# Patient Record
Sex: MALE | Race: ASIAN | NOT HISPANIC OR LATINO | Employment: OTHER | URBAN - METROPOLITAN AREA
[De-identification: names, ages, dates, MRNs, and addresses within clinical notes are randomized per-mention and may not be internally consistent; named-entity substitution may affect disease eponyms.]

---

## 2017-01-05 DIAGNOSIS — Z01.812 PRE-OPERATIVE LABORATORY EXAMINATION: ICD-10-CM

## 2017-01-05 LAB
ERYTHROCYTE [DISTWIDTH] IN BLOOD BY AUTOMATED COUNT: 41 FL (ref 35.9–50)
HCT VFR BLD AUTO: 44.1 % (ref 42–52)
HGB BLD-MCNC: 15.2 G/DL (ref 14–18)
MCH RBC QN AUTO: 29.9 PG (ref 27–33)
MCHC RBC AUTO-ENTMCNC: 34.5 G/DL (ref 33.7–35.3)
MCV RBC AUTO: 86.6 FL (ref 81.4–97.8)
PLATELET # BLD AUTO: 281 K/UL (ref 164–446)
PMV BLD AUTO: 10.4 FL (ref 9–12.9)
RBC # BLD AUTO: 5.09 M/UL (ref 4.7–6.1)
WBC # BLD AUTO: 8.5 K/UL (ref 4.8–10.8)

## 2017-01-05 PROCEDURE — 36415 COLL VENOUS BLD VENIPUNCTURE: CPT

## 2017-01-05 PROCEDURE — 85027 COMPLETE CBC AUTOMATED: CPT

## 2017-01-05 NOTE — OR NURSING
PreAdmit Appointment: Patient instructed to continue regularly prescribed medications through day before surgery. Instructed to take the following medications the day of surgery with a sip of water per Anesthesia protocol: Roxicodone if needed, Albuterol Inhaler if needed. Instructed pt to bring Albuterol Inhaler DOS.

## 2017-01-10 ENCOUNTER — APPOINTMENT (OUTPATIENT)
Dept: RADIOLOGY | Facility: MEDICAL CENTER | Age: 32
End: 2017-01-10
Attending: ORTHOPAEDIC SURGERY
Payer: MEDICAID

## 2017-01-10 ENCOUNTER — HOSPITAL ENCOUNTER (OUTPATIENT)
Facility: MEDICAL CENTER | Age: 32
End: 2017-01-10
Attending: ORTHOPAEDIC SURGERY | Admitting: ORTHOPAEDIC SURGERY
Payer: MEDICAID

## 2017-01-10 VITALS
TEMPERATURE: 97.5 F | HEIGHT: 72 IN | SYSTOLIC BLOOD PRESSURE: 128 MMHG | OXYGEN SATURATION: 98 % | BODY MASS INDEX: 32.22 KG/M2 | WEIGHT: 237.88 LBS | RESPIRATION RATE: 16 BRPM | DIASTOLIC BLOOD PRESSURE: 79 MMHG | HEART RATE: 87 BPM

## 2017-01-10 PROBLEM — M24.461: Status: ACTIVE | Noted: 2017-01-10

## 2017-01-10 PROCEDURE — 700101 HCHG RX REV CODE 250

## 2017-01-10 PROCEDURE — 110382 HCHG SHELL REV 271: Performed by: ORTHOPAEDIC SURGERY

## 2017-01-10 PROCEDURE — 73560 X-RAY EXAM OF KNEE 1 OR 2: CPT | Mod: RT

## 2017-01-10 PROCEDURE — 160025 RECOVERY II MINUTES (STATS): Performed by: ORTHOPAEDIC SURGERY

## 2017-01-10 PROCEDURE — 700111 HCHG RX REV CODE 636 W/ 250 OVERRIDE (IP): Performed by: ORTHOPAEDIC SURGERY

## 2017-01-10 PROCEDURE — A9270 NON-COVERED ITEM OR SERVICE: HCPCS

## 2017-01-10 PROCEDURE — 160039 HCHG SURGERY MINUTES - EA ADDL 1 MIN LEVEL 3: Performed by: ORTHOPAEDIC SURGERY

## 2017-01-10 PROCEDURE — 160047 HCHG PACU  - EA ADDL 30 MINS PHASE II: Performed by: ORTHOPAEDIC SURGERY

## 2017-01-10 PROCEDURE — 700102 HCHG RX REV CODE 250 W/ 637 OVERRIDE(OP)

## 2017-01-10 PROCEDURE — 160035 HCHG PACU - 1ST 60 MINS PHASE I: Performed by: ORTHOPAEDIC SURGERY

## 2017-01-10 PROCEDURE — 160046 HCHG PACU - 1ST 60 MINS PHASE II: Performed by: ORTHOPAEDIC SURGERY

## 2017-01-10 PROCEDURE — 160028 HCHG SURGERY MINUTES - 1ST 30 MINS LEVEL 3: Performed by: ORTHOPAEDIC SURGERY

## 2017-01-10 PROCEDURE — 700111 HCHG RX REV CODE 636 W/ 250 OVERRIDE (IP)

## 2017-01-10 PROCEDURE — 160009 HCHG ANES TIME/MIN: Performed by: ORTHOPAEDIC SURGERY

## 2017-01-10 PROCEDURE — 110371 HCHG SHELL REV 272: Performed by: ORTHOPAEDIC SURGERY

## 2017-01-10 PROCEDURE — A4606 OXYGEN PROBE USED W OXIMETER: HCPCS | Performed by: ORTHOPAEDIC SURGERY

## 2017-01-10 PROCEDURE — 160036 HCHG PACU - EA ADDL 30 MINS PHASE I: Performed by: ORTHOPAEDIC SURGERY

## 2017-01-10 PROCEDURE — 160048 HCHG OR STATISTICAL LEVEL 1-5: Performed by: ORTHOPAEDIC SURGERY

## 2017-01-10 PROCEDURE — 160002 HCHG RECOVERY MINUTES (STAT): Performed by: ORTHOPAEDIC SURGERY

## 2017-01-10 RX ORDER — OXYCODONE HYDROCHLORIDE 5 MG/1
5 TABLET ORAL EVERY 4 HOURS PRN
Qty: 60 TAB | Refills: 0 | Status: SHIPPED | OUTPATIENT
Start: 2017-01-10 | End: 2017-05-17

## 2017-01-10 RX ORDER — OXYCODONE HCL 5 MG/5 ML
SOLUTION, ORAL ORAL
Status: COMPLETED
Start: 2017-01-10 | End: 2017-01-10

## 2017-01-10 RX ORDER — HYDROCODONE BITARTRATE AND ACETAMINOPHEN 5; 325 MG/1; MG/1
1 TABLET ORAL EVERY 4 HOURS PRN
COMMUNITY
End: 2017-05-17

## 2017-01-10 RX ORDER — SODIUM CHLORIDE, SODIUM LACTATE, POTASSIUM CHLORIDE, CALCIUM CHLORIDE 600; 310; 30; 20 MG/100ML; MG/100ML; MG/100ML; MG/100ML
INJECTION, SOLUTION INTRAVENOUS
Status: DISCONTINUED | OUTPATIENT
Start: 2017-01-10 | End: 2017-01-10 | Stop reason: HOSPADM

## 2017-01-10 RX ADMIN — OXYCODONE HYDROCHLORIDE 10 MG: 5 SOLUTION ORAL at 16:10

## 2017-01-10 RX ADMIN — FENTANYL CITRATE 25 MCG: 50 INJECTION, SOLUTION INTRAMUSCULAR; INTRAVENOUS at 16:10

## 2017-01-10 RX ADMIN — FENTANYL CITRATE 25 MCG: 50 INJECTION, SOLUTION INTRAMUSCULAR; INTRAVENOUS at 16:18

## 2017-01-10 ASSESSMENT — PAIN SCALES - GENERAL
PAINLEVEL_OUTOF10: 4
PAINLEVEL_OUTOF10: 3
PAINLEVEL_OUTOF10: ASSUMED PAIN PRESENT
PAINLEVEL_OUTOF10: 5
PAINLEVEL_OUTOF10: 3
PAINLEVEL_OUTOF10: 3
PAINLEVEL_OUTOF10: ASSUMED PAIN PRESENT

## 2017-01-10 NOTE — OR NURSING
Patient allergies and NPO status verified, home medication reconciliation completed and belongings secured. Surgical site verified with patient. Patient verbalizes understanding of pain scale, expected course of stay and plan of care; patient and family state verbal understanding at this time. IV access established. Sequential in place as ordered.

## 2017-01-10 NOTE — IP AVS SNAPSHOT
1/10/2017          Iggy Hoyos  [address] [facility] [address]    Dear Iggy:    [facility] wants to ensure your discharge home is safe and you or your loved ones have had all your questions answered regarding your care after you leave the hospital.    You may receive a telephone call within two days of your discharge.  This call is to make certain you understand your discharge instructions as well as ensure we provided you with the best care possible during your stay with us.     The call will only last approximately 3-5 minutes and will be done by a nurse.    Once again, we want to ensure your discharge home is safe and that you have a clear understanding of any next steps in your care.  If you have any questions or concerns, please do not hesitate to contact us, we are here for you.  Thank you for choosing [facility] for your healthcare needs.    Sincerely,    Kenney Hendrickson    [facility]

## 2017-01-10 NOTE — IP AVS SNAPSHOT
After Visit Summary                                                                                                                Name:Iggy Hoyos  Medical Record Number:[id number]  CSN: [id number]    YOB: 1985   Age: 31 y.o.  Sex: male  HT:1.829 m (6') WT: 107.9 kg (237 lb 14 oz)          Admit Date: 1/10/2017     Discharge Date:   Today's Date: 1/10/2017  Attending Doctor:  JAKE Watts*                  Allergies:  Review of patient's allergies indicates no known allergies.                Discharge Instructions         ACTIVITY: Rest and take it easy for the first 24 hours.  A responsible adult is recommended to remain with you during that time.  It is normal to feel sleepy.  We encourage you to not do anything that requires balance, judgment or coordination.    MILD FLU-LIKE SYMPTOMS ARE NORMAL. YOU MAY EXPERIENCE GENERALIZED MUSCLE ACHES, THROAT IRRITATION, HEADACHE AND/OR SOME NAUSEA.    FOR 24 HOURS DO NOT:  Drive, operate machinery or run household appliances.  Drink beer or alcoholic beverages.   Make important decisions or sign legal documents.    SPECIAL INSTRUCTIONS:   MOBILITY LOWER EXTREMITY  You should remain toe-touch/touch-down weightbearing on the operative extremity, using a walker or crutches for assistance with ambulation.  You should keep your lower extremity elevated at or above the level of your heart to help decrease pain and swelling.  You are to keep your heel off the bed at all times to prevent pressure ulcers from forming.  You will continue with physical therapy and rehabilitation exercises as described in the hospital.    BLOOD CLOT PROPHYLAXIS - Aspirin  You should take enteric-coated aspirin (such as Ecotrin) 325 mg twice daily.  You should also take Prilosec 40 mg daily or Pepcid 20 mg twice daily when taking the enteric-coated aspirin (both of these medications can be purchased over-the-counter).  You should wear the PARRIS hose provided in the  hospital for a total of 6 weeks as well.  These may be removed at night while sleeping.  You have been instructed in the signs and symptoms of deep venous thrombosis (DVT), including: calf swelling, pain or redness, fever, chills, or sweats.  You have been instructed on the signs and symptoms of pulmonary embolism (PE), including: chest pain or shortness of breath.  Should you develop any of the above symptoms, you should contact us immediately and/or visit the local urgent care or emergency room.     PAIN MEDICATION - Oxycodone  Oxycodone, a narcotic pain medication, can cause numerous side effects including nausea, constipation, sedation, and confusion.  Narcotic pain medication should be used for 1 to 2 weeks after surgery.  It is recommended that a stool softener be used while taking this medication.  Any over-the-counter stool softener or laxative, such as Colace, Dulcolax, or Senokot, is appropriate.  At any time, acetaminophen (Tylenol) may be used for pain control.  One gram of Tylenol can be taken safely in most patients every 6 hours.  Do not drive, operate heavy machinery, ride motorcycles or ATVs, drink alcohol, or take other drugs that make you tired or sleepy while taking narcotic pain medications.  You should wean off of the narcotic pain medications as tolerated.  We expect you would be COMPLETELY off narcotic pain medications by 2-3 weeks after surgery.    DIET: To avoid nausea, slowly advance diet as tolerated, avoiding spicy or greasy foods for the first day.  Add more substantial food to your diet according to your physician's instructions.  Babies can be fed formula or breast milk as soon as they are hungry.  INCREASE FLUIDS AND FIBER TO AVOID CONSTIPATION.    SURGICAL DRESSING/BATHING: See Dr. Mendoza's discharge instruction sheet.    FOLLOW-UP APPOINTMENT:  A follow-up appointment should be arranged with your doctor; as scheduled.    You should CALL YOUR PHYSICIAN if you develop:  Fever  greater than 101 degrees F.  Pain not relieved by medication, or persistent nausea or vomiting.  Excessive bleeding (blood soaking through dressing) or unexpected drainage from the wound.  Extreme redness or swelling around the incision site, drainage of pus or foul smelling drainage.  Inability to urinate or empty your bladder within 8 hours.  Problems with breathing or chest pain.    You should call 911 if you develop problems with breathing or chest pain.  If you are unable to contact your doctor or surgical center, you should go to the nearest emergency room or urgent care center.    Physician: Dr. Mendoza's telephone #: [phone]    If any questions arise, call your doctor.  If your doctor is not available, please feel free to call the Surgical Center at [phone].  The Center is open Monday through Friday from 7AM to 7PM.  You can also call the [facility] HOTLINE open 24 hours/day, 7 days/week and speak to a nurse at [phone], or toll free at [phone].    A registered nurse may call you a few days after your surgery to see how you are doing after your procedure.    MEDICATIONS: Resume taking daily medication.  Take prescribed pain medication with food.  If no medication is prescribed, you may take non-aspirin pain medication if needed.  PAIN MEDICATION CAN BE VERY CONSTIPATING.  Take a stool softener or laxative such as senokot, pericolace, or milk of magnesia if needed.    Prescription given for preoperatively.  Last pain medication given at 4:10PM (10mg oxycodone).    If your physician has prescribed pain medication that includes Acetaminophen (Tylenol), do not take additional Acetaminophen (Tylenol) while taking the prescribed medication.    Depression / Suicide Risk    As you are discharged from this [facility] facility, it is important to learn how to keep safe from harming yourself.    Recognize the warning signs:  · Abrupt changes in personality, positive or negative- including  increase in energy   · Giving away possessions  · Change in eating patterns- significant weight changes-  positive or negative  · Change in sleeping patterns- unable to sleep or sleeping all the time   · Unwillingness or inability to communicate  · Depression  · Unusual sadness, discouragement and loneliness  · Talk of wanting to die  · Neglect of personal appearance   · Rebelliousness- reckless behavior  · Withdrawal from people/activities they love  · Confusion- inability to concentrate     If you or a loved one observes any of these behaviors or has concerns about self-harm, here's what you can do:  · Talk about it- your feelings and reasons for harming yourself  · Remove any means that you might use to hurt yourself (examples: pills, rope, extension cords, firearm)  · Get professional help from the community (Mental Health, Substance Abuse, psychological counseling)  · Do not be alone:Call your Safe Contact- someone whom you trust who will be there for you.  · Call your local CRISIS HOTLINE 255-7105 or 855-359-4915  · Call your local Children's Mobile Crisis Response Team [place] (577) 087-8476 or wwwMayvenn  · Call the toll free National Suicide Prevention Hotlines   · National Suicide Prevention Lifeline 455-603-OFTJ (8620)  · National Hope Line Network 800-SUICIDE (535-1343)       Medication List      CHANGE how you take these medications        Instructions    * oxycodone immediate-release 5 MG Tabs   What changed:  Another medication with the same name was added. Make sure you understand how and when to take each.   Commonly known as:  ROXICODONE    Take 1 Tab by mouth every four hours as needed for Severe Pain.   Dose:  5 mg       * oxycodone immediate-release 5 MG Tabs   What changed:  You were already taking a medication with the same name, and this prescription was added. Make sure you understand how and when to take each.   Commonly known as:  ROXICODONE    Take 1 Tab by mouth every  four hours as needed for Severe Pain.   Dose:  5 mg       * Notice:  This list has 2 medication(s) that are the same as other medications prescribed for you. Read the directions carefully, and ask your doctor or other care provider to review them with you.      CONTINUE taking these medications        Instructions    albuterol 108 (90 BASE) MCG/ACT Aers inhalation aerosol    Inhale 2 Puffs by mouth every 6 hours as needed.   Dose:  2 Puff       docusate sodium 100 MG Caps   Commonly known as:  COLACE    Take 1 Cap by mouth 2 times a day.   Dose:  100 mg       enoxaparin 40 MG/0.4ML Soln inj   Commonly known as:  LOVENOX    Inject 1 Syringe as instructed every day.   Dose:  1 Syringe       hydrocodone-acetaminophen 5-325 MG Tabs per tablet   Commonly known as:  NORCO    Take 1 Tab by mouth every four hours as needed.   Dose:  1 Tab               Medication Information     Above and/or attached are the medications your physician expects you to take upon discharge. Review all of your home medications and newly ordered medications with your doctor and/or pharmacist. Follow medication instructions as directed by your doctor and/or pharmacist. Please keep your medication list with you and share with your physician. Update the information when medications are discontinued, doses are changed, or new medications (including over-the-counter products) are added; and carry medication information at all times in the event of emergency situations.        Resources     Quit Smoking / Tobacco Use:    I understand the use of any tobacco products increases my chance of suffering from future heart disease or stroke and could cause other illnesses which may shorten my life. Quitting the use of tobacco products is the single most important thing I can do to improve my health. For further information on smoking / tobacco cessation call a Toll Free Quit Line at 1-751.675.1522 (*National [employer] [place]) or 1-437.698.8819 (American Lung  Association) or you can access the web based program at www.lung[place].org.    [place] Tobacco Users Help Line:  (409) 427-5936       Toll Free: 1-183.387.8275  Quit Tobacco Program [facility] (418)465-8242    Crisis Hotline:    [place] Crisis Hotline:  0-252-OBTONYP or 1-524.687.1057    [place] Crisis Hotline:    1-207.670.3172 or 689-080-4444    Discharge Survey:   [facility] We hope we did everything we could to make your hospital stay a pleasant one. You may be receiving a survey and we would appreciate your time and participation in answering the questions. Your input is very valuable to us in our efforts to improve our service to our patients and their families.            Signatures     My signature on this form indicates that:    1. I acknowledge receipt and understanding of these Home Care Instruction.  2. My questions regarding this information have been answered to my satisfaction.  3. I have formulated a plan with my discharge nurse to obtain my prescribed medications for home.    __________________________________      __________________________________                   Patient Signature                                 Guardian/Responsible Adult Signature      __________________________________                 __________       ________                       Nurse Signature                                               Date                 Time

## 2017-01-11 NOTE — DISCHARGE INSTRUCTIONS
ACTIVITY: Rest and take it easy for the first 24 hours.  A responsible adult is recommended to remain with you during that time.  It is normal to feel sleepy.  We encourage you to not do anything that requires balance, judgment or coordination.    MILD FLU-LIKE SYMPTOMS ARE NORMAL. YOU MAY EXPERIENCE GENERALIZED MUSCLE ACHES, THROAT IRRITATION, HEADACHE AND/OR SOME NAUSEA.    FOR 24 HOURS DO NOT:  Drive, operate machinery or run household appliances.  Drink beer or alcoholic beverages.   Make important decisions or sign legal documents.    SPECIAL INSTRUCTIONS:   MOBILITY LOWER EXTREMITY  You should remain toe-touch/touch-down weightbearing on the operative extremity, using a walker or crutches for assistance with ambulation.  You should keep your lower extremity elevated at or above the level of your heart to help decrease pain and swelling.  You are to keep your heel off the bed at all times to prevent pressure ulcers from forming.  You will continue with physical therapy and rehabilitation exercises as described in the hospital.    BLOOD CLOT PROPHYLAXIS - Aspirin  You should take enteric-coated aspirin (such as Ecotrin) 325 mg twice daily.  You should also take Prilosec 40 mg daily or Pepcid 20 mg twice daily when taking the enteric-coated aspirin (both of these medications can be purchased over-the-counter).  You should wear the PARRIS hose provided in the hospital for a total of 6 weeks as well.  These may be removed at night while sleeping.  You have been instructed in the signs and symptoms of deep venous thrombosis (DVT), including: calf swelling, pain or redness, fever, chills, or sweats.  You have been instructed on the signs and symptoms of pulmonary embolism (PE), including: chest pain or shortness of breath.  Should you develop any of the above symptoms, you should contact us immediately and/or visit the local urgent care or emergency room.     PAIN MEDICATION - Oxycodone  Oxycodone, a narcotic pain  medication, can cause numerous side effects including nausea, constipation, sedation, and confusion.  Narcotic pain medication should be used for 1 to 2 weeks after surgery.  It is recommended that a stool softener be used while taking this medication.  Any over-the-counter stool softener or laxative, such as Colace, Dulcolax, or Senokot, is appropriate.  At any time, acetaminophen (Tylenol) may be used for pain control.  One gram of Tylenol can be taken safely in most patients every 6 hours.  Do not drive, operate heavy machinery, ride motorcycles or ATVs, drink alcohol, or take other drugs that make you tired or sleepy while taking narcotic pain medications.  You should wean off of the narcotic pain medications as tolerated.  We expect you would be COMPLETELY off narcotic pain medications by 2-3 weeks after surgery.    DIET: To avoid nausea, slowly advance diet as tolerated, avoiding spicy or greasy foods for the first day.  Add more substantial food to your diet according to your physician's instructions.  Babies can be fed formula or breast milk as soon as they are hungry.  INCREASE FLUIDS AND FIBER TO AVOID CONSTIPATION.    SURGICAL DRESSING/BATHING: See Dr. Mendoza's discharge instruction sheet.    FOLLOW-UP APPOINTMENT:  A follow-up appointment should be arranged with your doctor; as scheduled.    You should CALL YOUR PHYSICIAN if you develop:  Fever greater than 101 degrees F.  Pain not relieved by medication, or persistent nausea or vomiting.  Excessive bleeding (blood soaking through dressing) or unexpected drainage from the wound.  Extreme redness or swelling around the incision site, drainage of pus or foul smelling drainage.  Inability to urinate or empty your bladder within 8 hours.  Problems with breathing or chest pain.    You should call 911 if you develop problems with breathing or chest pain.  If you are unable to contact your doctor or surgical center, you should go to the nearest emergency  room or urgent care center.    Physician: Dr. Mendoza's telephone #: [phone]    If any questions arise, call your doctor.  If your doctor is not available, please feel free to call the Surgical Center at [phone].  The Center is open Monday through Friday from 7AM to 7PM.  You can also call the HEALTH HOTLINE open 24 hours/day, 7 days/week and speak to a nurse at [phone], or toll free at [phone].    A registered nurse may call you a few days after your surgery to see how you are doing after your procedure.    MEDICATIONS: Resume taking daily medication.  Take prescribed pain medication with food.  If no medication is prescribed, you may take non-aspirin pain medication if needed.  PAIN MEDICATION CAN BE VERY CONSTIPATING.  Take a stool softener or laxative such as senokot, pericolace, or milk of magnesia if needed.    Prescription given for preoperatively.  Last pain medication given at 4:10PM (10mg oxycodone).    If your physician has prescribed pain medication that includes Acetaminophen (Tylenol), do not take additional Acetaminophen (Tylenol) while taking the prescribed medication.    Depression / Suicide Risk    As you are discharged from this [facility] facility, it is important to learn how to keep safe from harming yourself.    Recognize the warning signs:  · Abrupt changes in personality, positive or negative- including increase in energy   · Giving away possessions  · Change in eating patterns- significant weight changes-  positive or negative  · Change in sleeping patterns- unable to sleep or sleeping all the time   · Unwillingness or inability to communicate  · Depression  · Unusual sadness, discouragement and loneliness  · Talk of wanting to die  · Neglect of personal appearance   · Rebelliousness- reckless behavior  · Withdrawal from people/activities they love  · Confusion- inability to concentrate     If you or a loved one observes any of these behaviors or has concerns  about self-harm, here's what you can do:  · Talk about it- your feelings and reasons for harming yourself  · Remove any means that you might use to hurt yourself (examples: pills, rope, extension cords, firearm)  · Get professional help from the community (Mental Health, Substance Abuse, psychological counseling)  · Do not be alone:Call your Safe Contact- someone whom you trust who will be there for you.  · Call your local CRISIS HOTLINE 389-9444 or 842-527-8378  · Call your local Children's Mobile Crisis Response Team [place] (946) 121-4102 or www.[facility]  · Call the toll free National Suicide Prevention Hotlines   · National Suicide Prevention Lifeline 140-579-ITFQ (5113)  · National Hope Line Network 800-SUICIDE (443-8414)

## 2017-01-11 NOTE — OR NURSING
1725 - Report from [facility]TY [facility]. Patient awake and cooperative. Knee immobilizer in place with positive CMS RLE. Ice pack to right knee area. Pain 3/10 and very tolerable. Denies nausea - tolerating juice. Assisted with dressing and assisted with transfer to lounge chair.     1735 - VS as noted. Father to chairside.     1745 - Discharge instructions to patient and father - state understanding .    1805 - Remains as above, VS as noted. Meets criteria and discharged via wheelchair to father to private vehicle.

## 2017-01-11 NOTE — OP REPORT
DATE OF SERVICE:  01/10/2017    SURGEON:  Ed Mendoza MD    ASSISTANT:  None.    PREOPERATIVE DIAGNOSES:    1.  Right knee fracture dislocation.  2.  Right knee intraarticular osteochondral loose bodies.  3.  Right knee lateral meniscus tear.  4.  Right knee anterior cruciate ligament tear.  5.  Right knee posterior cruciate ligament tear.  6.  Right knee medial collateral ligament tear.  7.  Right knee lateral tibial plateau osteochondral defect.     PROCEDURES PERFORMED:  1.  Right lower extremity external fixator removal.   2.  Right knee manipulation under anesthesia.  3.  Right knee examination under anesthesia.     ANESTHESIA:  General anesthesia.    INDICATIONS:  Treat right knee fracture dislocation, improve pain, and improve   function.    HISTORY OF PRESENT ILLNESS:  The patient is a very pleasant 31-year-old male,   who experienced a severe injury to his right knee in November.  The patient   was placed in external fixator at that time.  We then brought back to the   operating room on 12/08/2016 for external fixator removal and reapplication   knee arthrotomy, loose body removal ____ repair.  We plan on proceeding with   osteochondral reconstruction of the lateral tibial plateau as well as   multiligamentous reconstruction at some point in the future.  The plan today   is to remove the external fixator and transition the patient to hinged knee   brace and begin range of motion.      INFORMED CONSENT:  The patient was informed of the risks, benefits, and   alternatives to the planned operation.  The risks include, but not limited to   bleeding, infection, neurovascular damage, persistent pain, stiffness, DVT,   PE, MI, stroke, and death.  Advanced directives were reviewed.  After   answering all questions, the patient elected to proceed with planned   operation.    IMPLANTS:  None.    DESCRIPTION OF PROCEDURE:  The patient was identified in the preoperative   holding area.  The correct procedural  side and site were identified and   marked.  The patient was then brought to the operating room and transferred to   the operating room table where all bony prominences were well padded.  The   patient underwent general anesthesia by the anesthesia team.    The right lower extremity was then prepped and draped in normal standard   sterile fashion including the external fixator.  A procedural pause was then   performed by the operating room team.  The procedure, patient's identity, the   operative side, the surgical site, and the procedure to be performed were all   verified.  The patient was given IV antibiotics prior to incision.    I first turned my attention to the external fixator.  All of the bars and pins   were removed.  I then used a curette to debride some of the   necrotic-appearing tissue around the pin sites as well as the underlying bone.    I then loosely approximated the 4 pin site wounds with loose 2-0 nylon   suture.  I then turned my attention to the knee joint itself.  There appeared   to be a well healed incision over the anterior aspect of the joint in the   previous arthrotomy.  The range of motion at the time of the external fixator   was approximately 0-30 degrees.  Gentle manipulation was performed and I was   able to achieve a range of motion from 0 to about 100 degrees.      Xeroform was applied over all incisions, followed by a sterile compressive   dressing.  We then brought in fluoroscopy.  I performed a repeat examination   under anesthesia.  In a neutral position, the tibia actually was in a   relatively centralized position, which is minimal lateral subluxation.  With   valgus stress, there is still evidence of some opening of the medial   compartment, slightly more than the contralateral _____ with a grade II injury   with a definite endpoint.  No obvious instability with varus stress.  Lateral   view once again demonstrated the tibia was _____ reduced and there is no   significant  displacement with anterior and posterior drawer.  The patient was   then placed in a well padded hinged knee brace locked in full extension.    Needle and sponge counts were correct at the end of the procedure as reported   to the surgeon by the circulating nurse.  The patient tolerated the procedure   well with no obvious intraoperative complications.  The patient was then   transferred off the operating room table on regular hospital bed and was   extubated by the anesthesia team.    ESTIMATED BLOOD LOSS:  10 mL.    COMPLICATIONS:  None.    TOURNIQUET TIME:  None.    SPECIMENS:  None.    WOUND TYPE:  Type 2, clean contaminated.    POSTOPERATIVE PLAN:  The patient will be transferred back to the postoperative   unit.  I anticipate the patient will be discharged from the hospital later on   this evening once mobilizing safely.  We will have the patient remain in the   hinged knee brace, but he slowly began some gentle range of motion.  We will   anticipate definitive surgical reconstruction at some point in the near future   depending on the availability of the osteochondral allograft tissue.       ____________________________________     MD NIKHIL Larsen / SVITLANA    DD:  01/10/2017 17:35:03  DT:  01/10/2017 20:02:17    D#:  [id number]  Job#:  [id number]

## 2017-01-11 NOTE — OR NURSING
1555: To PACU from OR via gurney, sleeping, respirations spontaneous and non-labored via LMA, NC @ 4L blow-by. Dressing to RLE CDI, brace in place, cap refill to toes < 3 secs.  1559: arouses, LMA d/lisa. NC applied @ 4L. Wiggles toes.  1610: pain 4/10 to right knee, medicated.  1618: pain 5/10, medicated.  1638: pain 3/10, improving.  1650: pain tolerable. Tolerating RA.  1710: meets criteria for stage 2, will transfer when RN available.  1725: report to DOTTY [facility].

## 2017-04-19 ENCOUNTER — APPOINTMENT (OUTPATIENT)
Dept: RADIOLOGY | Facility: MEDICAL CENTER | Age: 32
End: 2017-04-19
Attending: ORTHOPAEDIC SURGERY
Payer: MEDICAID

## 2017-04-19 DIAGNOSIS — M24.461 RECURRENT DISLOCATION OF RIGHT KNEE: ICD-10-CM

## 2017-04-19 DIAGNOSIS — S83.511A SPRAIN OF ANTERIOR CRUCIATE LIGAMENT OF RIGHT KNEE, INITIAL ENCOUNTER: ICD-10-CM

## 2017-04-19 DIAGNOSIS — S82.891D CLOSED FRACTURE OF RIGHT ANKLE WITH ROUTINE HEALING: ICD-10-CM

## 2017-04-19 PROCEDURE — 73721 MRI JNT OF LWR EXTRE W/O DYE: CPT | Mod: RT

## 2017-05-17 ENCOUNTER — APPOINTMENT (OUTPATIENT)
Dept: ADMISSIONS | Facility: MEDICAL CENTER | Age: 32
End: 2017-05-17
Attending: ORTHOPAEDIC SURGERY
Payer: MEDICAID

## 2017-05-17 NOTE — OR NURSING
PreAdmit Appointment: Patient instructed to continue regularly prescribed medications through day before surgery. Instructed to take the following medications the day of surgery with a sip of water per Anesthesia protocol: Albuterol inhaler if needed.

## 2017-05-25 ENCOUNTER — APPOINTMENT (OUTPATIENT)
Dept: RADIOLOGY | Facility: MEDICAL CENTER | Age: 32
End: 2017-05-25
Attending: ORTHOPAEDIC SURGERY
Payer: MEDICAID

## 2017-05-25 ENCOUNTER — HOSPITAL ENCOUNTER (OUTPATIENT)
Facility: MEDICAL CENTER | Age: 32
End: 2017-05-26
Attending: ORTHOPAEDIC SURGERY | Admitting: ORTHOPAEDIC SURGERY
Payer: MEDICAID

## 2017-05-25 PROBLEM — S83.521A SPRAIN OF POSTERIOR CRUCIATE LIGAMENT OF RIGHT KNEE: Status: ACTIVE | Noted: 2017-05-25

## 2017-05-25 PROCEDURE — 160029 HCHG SURGERY MINUTES - 1ST 30 MINS LEVEL 4: Performed by: ORTHOPAEDIC SURGERY

## 2017-05-25 PROCEDURE — 700111 HCHG RX REV CODE 636 W/ 250 OVERRIDE (IP): Performed by: PHYSICIAN ASSISTANT

## 2017-05-25 PROCEDURE — 160041 HCHG SURGERY MINUTES - EA ADDL 1 MIN LEVEL 4: Performed by: ORTHOPAEDIC SURGERY

## 2017-05-25 PROCEDURE — A9270 NON-COVERED ITEM OR SERVICE: HCPCS | Performed by: PHYSICIAN ASSISTANT

## 2017-05-25 PROCEDURE — 500028 HCHG ARTHROWAND TURBOVAC 3.5/90 SUCT.: Performed by: ORTHOPAEDIC SURGERY

## 2017-05-25 PROCEDURE — 501336 HCHG SHAVER: Performed by: ORTHOPAEDIC SURGERY

## 2017-05-25 PROCEDURE — 501486 HCHG STRYKER IRRIG SET HC W/TUBING: Performed by: ORTHOPAEDIC SURGERY

## 2017-05-25 PROCEDURE — 160002 HCHG RECOVERY MINUTES (STAT): Performed by: ORTHOPAEDIC SURGERY

## 2017-05-25 PROCEDURE — 501654 HCHG TUBING, ARTHREX PATIENT REDEUCE: Performed by: ORTHOPAEDIC SURGERY

## 2017-05-25 PROCEDURE — A6222 GAUZE <=16 IN NO W/SAL W/O B: HCPCS | Performed by: ORTHOPAEDIC SURGERY

## 2017-05-25 PROCEDURE — 96374 THER/PROPH/DIAG INJ IV PUSH: CPT

## 2017-05-25 PROCEDURE — 502240 HCHG MISC OR SUPPLY RC 0272: Performed by: ORTHOPAEDIC SURGERY

## 2017-05-25 PROCEDURE — G0378 HOSPITAL OBSERVATION PER HR: HCPCS

## 2017-05-25 PROCEDURE — 700112 HCHG RX REV CODE 229: Performed by: PHYSICIAN ASSISTANT

## 2017-05-25 PROCEDURE — 502000 HCHG MISC OR IMPLANTS RC 0278: Performed by: ORTHOPAEDIC SURGERY

## 2017-05-25 PROCEDURE — 94760 N-INVAS EAR/PLS OXIMETRY 1: CPT | Mod: XU

## 2017-05-25 PROCEDURE — C1713 ANCHOR/SCREW BN/BN,TIS/BN: HCPCS | Performed by: ORTHOPAEDIC SURGERY

## 2017-05-25 PROCEDURE — 700111 HCHG RX REV CODE 636 W/ 250 OVERRIDE (IP)

## 2017-05-25 PROCEDURE — C1762 CONN TISS, HUMAN(INC FASCIA): HCPCS | Performed by: ORTHOPAEDIC SURGERY

## 2017-05-25 PROCEDURE — 700105 HCHG RX REV CODE 258

## 2017-05-25 PROCEDURE — A9270 NON-COVERED ITEM OR SERVICE: HCPCS

## 2017-05-25 PROCEDURE — 160036 HCHG PACU - EA ADDL 30 MINS PHASE I: Performed by: ORTHOPAEDIC SURGERY

## 2017-05-25 PROCEDURE — 500881 HCHG PACK, EXTREMITY: Performed by: ORTHOPAEDIC SURGERY

## 2017-05-25 PROCEDURE — 501838 HCHG SUTURE GENERAL: Performed by: ORTHOPAEDIC SURGERY

## 2017-05-25 PROCEDURE — 700102 HCHG RX REV CODE 250 W/ 637 OVERRIDE(OP): Performed by: PHYSICIAN ASSISTANT

## 2017-05-25 PROCEDURE — 501498 HCHG SURG-I-LOOP, MAXI (BLUE): Performed by: ORTHOPAEDIC SURGERY

## 2017-05-25 PROCEDURE — 700101 HCHG RX REV CODE 250

## 2017-05-25 PROCEDURE — 502580 HCHG PACK, KNEE ARTHROSCOPY: Performed by: ORTHOPAEDIC SURGERY

## 2017-05-25 PROCEDURE — 501445 HCHG STAPLER, SKIN DISP: Performed by: ORTHOPAEDIC SURGERY

## 2017-05-25 PROCEDURE — 700102 HCHG RX REV CODE 250 W/ 637 OVERRIDE(OP)

## 2017-05-25 PROCEDURE — 160035 HCHG PACU - 1ST 60 MINS PHASE I: Performed by: ORTHOPAEDIC SURGERY

## 2017-05-25 PROCEDURE — 73560 X-RAY EXAM OF KNEE 1 OR 2: CPT | Mod: RT

## 2017-05-25 PROCEDURE — 501402 HCHG SPLASH GUARD, FOR PULSEVAC: Performed by: ORTHOPAEDIC SURGERY

## 2017-05-25 PROCEDURE — 160048 HCHG OR STATISTICAL LEVEL 1-5: Performed by: ORTHOPAEDIC SURGERY

## 2017-05-25 PROCEDURE — 160009 HCHG ANES TIME/MIN: Performed by: ORTHOPAEDIC SURGERY

## 2017-05-25 PROCEDURE — 160022 HCHG BLOCK: Performed by: ORTHOPAEDIC SURGERY

## 2017-05-25 DEVICE — SUTURE ANCHOR 4.5MM FOOTPRINT PEEK KNOTLESS: Type: IMPLANTABLE DEVICE | Status: FUNCTIONAL

## 2017-05-25 DEVICE — IMPLANTABLE DEVICE: Type: IMPLANTABLE DEVICE | Status: FUNCTIONAL

## 2017-05-25 DEVICE — GRAFT SOFT TISSUE ACHILLES TENDON WITH BONE ALLOGRAFT: Type: IMPLANTABLE DEVICE | Status: FUNCTIONAL

## 2017-05-25 DEVICE — GRAFT SOFT TISSUE ANTERIOR TIBIALIS TENDON <24CM: Type: IMPLANTABLE DEVICE | Status: FUNCTIONAL

## 2017-05-25 DEVICE — SCREW BIOSURE 10 X 25MM: Type: IMPLANTABLE DEVICE | Status: FUNCTIONAL

## 2017-05-25 DEVICE — SUTURE ANCHOR FOOTPRINT FIX 4.5MM: Type: IMPLANTABLE DEVICE | Status: FUNCTIONAL

## 2017-05-25 DEVICE — SCREW BIOSURE 9X25MM: Type: IMPLANTABLE DEVICE | Status: FUNCTIONAL

## 2017-05-25 RX ORDER — HALOPERIDOL 5 MG/ML
1 INJECTION INTRAMUSCULAR EVERY 6 HOURS PRN
Status: DISCONTINUED | OUTPATIENT
Start: 2017-05-25 | End: 2017-05-26 | Stop reason: HOSPADM

## 2017-05-25 RX ORDER — OXYCODONE HCL 5 MG/5 ML
SOLUTION, ORAL ORAL
Status: COMPLETED
Start: 2017-05-25 | End: 2017-05-25

## 2017-05-25 RX ORDER — POLYETHYLENE GLYCOL 3350 17 G/17G
1 POWDER, FOR SOLUTION ORAL 2 TIMES DAILY PRN
Status: DISCONTINUED | OUTPATIENT
Start: 2017-05-25 | End: 2017-05-26 | Stop reason: HOSPADM

## 2017-05-25 RX ORDER — AMOXICILLIN 250 MG
1 CAPSULE ORAL
Status: DISCONTINUED | OUTPATIENT
Start: 2017-05-25 | End: 2017-05-26 | Stop reason: HOSPADM

## 2017-05-25 RX ORDER — SODIUM CHLORIDE, SODIUM LACTATE, POTASSIUM CHLORIDE, CALCIUM CHLORIDE 600; 310; 30; 20 MG/100ML; MG/100ML; MG/100ML; MG/100ML
INJECTION, SOLUTION INTRAVENOUS CONTINUOUS
Status: DISCONTINUED | OUTPATIENT
Start: 2017-05-25 | End: 2017-05-26 | Stop reason: HOSPADM

## 2017-05-25 RX ORDER — ONDANSETRON 2 MG/ML
4 INJECTION INTRAMUSCULAR; INTRAVENOUS EVERY 4 HOURS PRN
Status: DISCONTINUED | OUTPATIENT
Start: 2017-05-25 | End: 2017-05-26 | Stop reason: HOSPADM

## 2017-05-25 RX ORDER — OXYCODONE HYDROCHLORIDE 5 MG/1
5 TABLET ORAL
Status: DISCONTINUED | OUTPATIENT
Start: 2017-05-25 | End: 2017-05-26 | Stop reason: HOSPADM

## 2017-05-25 RX ORDER — DEXAMETHASONE SODIUM PHOSPHATE 4 MG/ML
4 INJECTION, SOLUTION INTRA-ARTICULAR; INTRALESIONAL; INTRAMUSCULAR; INTRAVENOUS; SOFT TISSUE
Status: DISCONTINUED | OUTPATIENT
Start: 2017-05-25 | End: 2017-05-26 | Stop reason: HOSPADM

## 2017-05-25 RX ORDER — ACETAMINOPHEN 500 MG
1000 TABLET ORAL EVERY 6 HOURS
Status: DISCONTINUED | OUTPATIENT
Start: 2017-05-25 | End: 2017-05-26 | Stop reason: HOSPADM

## 2017-05-25 RX ORDER — ROPIVACAINE HYDROCHLORIDE 5 MG/ML
INJECTION, SOLUTION EPIDURAL; INFILTRATION; PERINEURAL
Status: DISCONTINUED | OUTPATIENT
Start: 2017-05-25 | End: 2017-05-25 | Stop reason: HOSPADM

## 2017-05-25 RX ORDER — OXYCODONE HYDROCHLORIDE 10 MG/1
10 TABLET ORAL
Status: DISCONTINUED | OUTPATIENT
Start: 2017-05-25 | End: 2017-05-26 | Stop reason: HOSPADM

## 2017-05-25 RX ORDER — SODIUM CHLORIDE, SODIUM LACTATE, POTASSIUM CHLORIDE, CALCIUM CHLORIDE 600; 310; 30; 20 MG/100ML; MG/100ML; MG/100ML; MG/100ML
1000 INJECTION, SOLUTION INTRAVENOUS
Status: COMPLETED | OUTPATIENT
Start: 2017-05-25 | End: 2017-05-25

## 2017-05-25 RX ORDER — ENEMA 19; 7 G/133ML; G/133ML
1 ENEMA RECTAL
Status: DISCONTINUED | OUTPATIENT
Start: 2017-05-25 | End: 2017-05-26 | Stop reason: HOSPADM

## 2017-05-25 RX ORDER — DOCUSATE SODIUM 100 MG/1
100 CAPSULE, LIQUID FILLED ORAL 2 TIMES DAILY
Status: DISCONTINUED | OUTPATIENT
Start: 2017-05-25 | End: 2017-05-26 | Stop reason: HOSPADM

## 2017-05-25 RX ORDER — AMOXICILLIN 250 MG
1 CAPSULE ORAL NIGHTLY
Status: DISCONTINUED | OUTPATIENT
Start: 2017-05-25 | End: 2017-05-26 | Stop reason: HOSPADM

## 2017-05-25 RX ORDER — BISACODYL 10 MG
10 SUPPOSITORY, RECTAL RECTAL
Status: DISCONTINUED | OUTPATIENT
Start: 2017-05-25 | End: 2017-05-26 | Stop reason: HOSPADM

## 2017-05-25 RX ORDER — CELECOXIB 200 MG/1
200 CAPSULE ORAL 2 TIMES DAILY WITH MEALS
Status: DISCONTINUED | OUTPATIENT
Start: 2017-05-25 | End: 2017-05-26 | Stop reason: HOSPADM

## 2017-05-25 RX ORDER — SCOLOPAMINE TRANSDERMAL SYSTEM 1 MG/1
1 PATCH, EXTENDED RELEASE TRANSDERMAL
Status: DISCONTINUED | OUTPATIENT
Start: 2017-05-25 | End: 2017-05-26 | Stop reason: HOSPADM

## 2017-05-25 RX ORDER — DIPHENHYDRAMINE HYDROCHLORIDE 50 MG/ML
25 INJECTION INTRAMUSCULAR; INTRAVENOUS EVERY 6 HOURS PRN
Status: DISCONTINUED | OUTPATIENT
Start: 2017-05-25 | End: 2017-05-26 | Stop reason: HOSPADM

## 2017-05-25 RX ADMIN — ACETAMINOPHEN 1000 MG: 500 TABLET ORAL at 20:12

## 2017-05-25 RX ADMIN — SODIUM CHLORIDE, SODIUM LACTATE, POTASSIUM CHLORIDE, CALCIUM CHLORIDE: 600; 310; 30; 20 INJECTION, SOLUTION INTRAVENOUS at 18:25

## 2017-05-25 RX ADMIN — CEFAZOLIN 2 G: 1 INJECTION, POWDER, FOR SOLUTION INTRAVENOUS at 21:51

## 2017-05-25 RX ADMIN — FENTANYL CITRATE 25 MCG: 50 INJECTION, SOLUTION INTRAMUSCULAR; INTRAVENOUS at 18:03

## 2017-05-25 RX ADMIN — OXYCODONE HYDROCHLORIDE 5 MG: 5 TABLET ORAL at 21:50

## 2017-05-25 RX ADMIN — FENTANYL CITRATE 25 MCG: 50 INJECTION, SOLUTION INTRAMUSCULAR; INTRAVENOUS at 18:28

## 2017-05-25 RX ADMIN — FENTANYL CITRATE 25 MCG: 50 INJECTION, SOLUTION INTRAMUSCULAR; INTRAVENOUS at 18:36

## 2017-05-25 RX ADMIN — OXYCODONE HYDROCHLORIDE 5 MG: 5 SOLUTION ORAL at 17:57

## 2017-05-25 RX ADMIN — ACETAMINOPHEN 1000 MG: 500 TABLET ORAL at 23:55

## 2017-05-25 RX ADMIN — DOCUSATE SODIUM AND SENNOSIDES 1 TABLET: 8.6; 5 TABLET, FILM COATED ORAL at 20:13

## 2017-05-25 RX ADMIN — FENTANYL CITRATE 25 MCG: 50 INJECTION, SOLUTION INTRAMUSCULAR; INTRAVENOUS at 18:20

## 2017-05-25 RX ADMIN — SODIUM CHLORIDE, SODIUM LACTATE, POTASSIUM CHLORIDE, CALCIUM CHLORIDE 1000 ML: 600; 310; 30; 20 INJECTION, SOLUTION INTRAVENOUS at 12:13

## 2017-05-25 RX ADMIN — DOCUSATE SODIUM 100 MG: 100 CAPSULE, LIQUID FILLED ORAL at 20:13

## 2017-05-25 RX ADMIN — HYDROMORPHONE HYDROCHLORIDE 0.5 MG: 1 INJECTION, SOLUTION INTRAMUSCULAR; INTRAVENOUS; SUBCUTANEOUS at 23:55

## 2017-05-25 ASSESSMENT — PAIN SCALES - GENERAL
PAINLEVEL_OUTOF10: 1
PAINLEVEL_OUTOF10: 4
PAINLEVEL_OUTOF10: 4
PAINLEVEL_OUTOF10: 3
PAINLEVEL_OUTOF10: 4
PAINLEVEL_OUTOF10: ASSUMED PAIN PRESENT
PAINLEVEL_OUTOF10: 4
PAINLEVEL_OUTOF10: 4
PAINLEVEL_OUTOF10: ASSUMED PAIN PRESENT
PAINLEVEL_OUTOF10: 4
PAINLEVEL_OUTOF10: 3
PAINLEVEL_OUTOF10: 7

## 2017-05-25 ASSESSMENT — PATIENT HEALTH QUESTIONNAIRE - PHQ9
SUM OF ALL RESPONSES TO PHQ9 QUESTIONS 1 AND 2: 0
1. LITTLE INTEREST OR PLEASURE IN DOING THINGS: NOT AT ALL
2. FEELING DOWN, DEPRESSED, IRRITABLE, OR HOPELESS: NOT AT ALL
SUM OF ALL RESPONSES TO PHQ QUESTIONS 1-9: 0

## 2017-05-25 ASSESSMENT — LIFESTYLE VARIABLES
ALCOHOL_USE: NO
EVER_SMOKED: NEVER

## 2017-05-25 NOTE — IP AVS SNAPSHOT
" <p align=\"LEFT\"><IMG SRC=\"//[email or web address]\" alt=\"Image\" WIDTH=\"50%\" HEIGHT=\"200\" BORDER=\"\"></p>                   Name:Iggy Hoyos  Medical Record Number:[id number]  CSN: [id number]    YOB: 1985   Age: 31 y.o.  Sex: male  HT:1.829 m (6') WT: 120 kg (264 lb 8.8 oz)          Admit Date: 5/25/2017     Discharge Date:   Today's Date: 5/26/2017  Attending Doctor:  JAKE Watts*                  Allergies:  Review of patient's allergies indicates no known allergies.          Follow-up Information     1. Follow up with Ed Mendoza M.D..    Specialty:  Orthopaedics    Contact information    [address]  [address]  [facility] [address]  [phone]           Medication List      Take these Medications        Instructions    albuterol 108 (90 BASE) MCG/ACT Aers inhalation aerosol    Inhale 2 Puffs by mouth every 6 hours as needed.   Dose:  2 Puff       Aspirin 325 MG Tbec    Take 1 Tab by mouth 2 Times a Day.   Dose:  325 mg       ondansetron 4 MG Tabs tablet   Commonly known as:  ZOFRAN    Take 1 Tab by mouth every four hours as needed for Nausea/Vomiting.   Dose:  4 mg       oxycodone immediate-release 5 MG Tabs   Commonly known as:  ROXICODONE    Take 1 Tab by mouth every four hours as needed (pain).   Dose:  5 mg         "

## 2017-05-25 NOTE — IP AVS SNAPSHOT
[employer] Access Code: Activation code not generated  Current [employer] Status: Active    [employer]  A secure, online tool to manage your health information     [employer]® is a secure, online tool that connects you to your personalized health information from the privacy of your home -- day or night - making it very easy for you to manage your healthcare. Once the activation process is completed, you can even access your medical information using the [employer] eduardo, which is available for free in the Apple Eduardo store or Google Play store.     [employer] provides the following levels of access (as shown below):   My Chart Features   [facility] Primary Care Doctor [facility]  Specialists [facility]  Urgent  Care Non-[facility]  Primary Care  Doctor   Email your healthcare team securely and privately 24/7 X X X X   Manage appointments: schedule your next appointment; view details of past/upcoming appointments X      Request prescription refills. X      View recent personal medical records, including lab and immunizations X X X X   View health record, including health history, allergies, medications X X X X   Read reports about your outpatient visits, procedures, consult and ER notes X X X X   See your discharge summary, which is a recap of your hospital and/or ER visit that includes your diagnosis, lab results, and care plan. X X       How to register for [employer]:  1. Go to  [email or web address].  2. Click on the Sign Up Now box, which takes you to the New Member Sign Up page. You will need to provide the following information:  a. Enter your [employer] Access Code exactly as it appears at the top of this page. (You will not need to use this code after you’ve completed the sign-up process. If you do not sign up before the expiration date, you must request a new code.)   b. Enter your date of birth.   c. Enter your home email address.   d. Click Submit, and follow the next screen’s instructions.  3. Create a [employer] ID. This will  be your [employer] login ID and cannot be changed, so think of one that is secure and easy to remember.  4. Create a [employer] password. You can change your password at any time.  5. Enter your Password Reset Question and Answer. This can be used at a later time if you forget your password.   6. Enter your e-mail address. This allows you to receive e-mail notifications when new information is available in [employer].  7. Click Sign Up. You can now view your health information.    For assistance activating your [employer] account, call [phone]

## 2017-05-25 NOTE — IP AVS SNAPSHOT
Home Care Instructions                                                                                                                  Name:Iggy Hoyos  Medical Record Number:[id number]  CSN: [id number]    YOB: 1985   Age: 31 y.o.  Sex: male  HT:1.829 m (6') WT: 120 kg (264 lb 8.8 oz)          Admit Date: 5/25/2017     Discharge Date:   Today's Date: 5/26/2017  Attending Doctor:  JAKE Watts*                  Allergies:  Review of patient's allergies indicates no known allergies.            Discharge Instructions       CONTACT INFORMATION   If you have any questions or concerns, please contact Allison Martin (medical assistant to Dr. Mendoza) at the [facility] ([facility]) at [phone] during normal business hours (Monday-Friday 8:00AM-5:00PM) or the main office number at [phone] after normal business hours.     DIET   Resume your regular diet slowly and as tolerated.     MOBILITY LOWER EXTREMITY   You should remain toe-touch/touch-down weightbearing on the operative extremity, using a walker or crutches for assistance with ambulation.  You should keep your lower extremity elevated at or above the level of your heart to help decrease pain and swelling.  You are to keep your heel off the bed at all times to prevent pressure ulcers from forming.  You will continue with physical therapy and rehabilitation exercises as described in the hospital.  DO NOT place pillows or blankets underneath the operative knee.  Placing pillows or blankets underneath the operative knee will cause your knee to be bent/flexed for long periods of time, making it difficult to get the knee straight/fully extended after surgery.     SHOWERING/DRESSING   You have a surgical incision(s).  You will see stitches or staples covering the incisions - these will stay in until your first postoperative appointment.  Once the surgical dressing has been removed, you may shower as normal.  Let water run  freely over the incisions and then pat the area dry with a clean towel.  You may then leave the incisions open to the air, or you may cover them with a simple fresh dry dressing and/or ACE wrap.  DO NOT scrub the incisions.  DO NOT apply soap, ointments, lotions, or Bacitracin on the incisions for at least 6 weeks after surgery.  DO NOT submerge the incisions in a bath, pool, river, hot tub, lake, etc. For at least 6 weeks after surgery.     ICING   Apply ice to the operative area near full time for the first 5 days after surgery.  Be sure to have a surgical dressing or towel between the ice and skin.  After 5 days, you should apply ice for only 10 minutes 2-3 times per day.       DRIVING   You should not operate a motor vehicle until you have met the following conditions: you have not required narcotic pain medications for at least 24 hours, your pain is well controlled with over-the-counter medications (such as Tylenol), you are Toe Touch Weight Bearing on surgical leg, you are comfortable wearing a seatbelt, and you are comfortable getting in and out of the vehicle.       BLOOD CLOT PROPHYLAXIS - Aspirin   You should take enteric-coated aspirin (such as Ecotrin) 325 mg twice daily for 6 weeks.  You should also take Prilosec 40 mg daily or Pepcid 20 mg twice daily when taking the enteric-coated aspirin (both of these medications can be purchased over-the-counter).  You should wear the PARRIS hose provided in the hospital for a total of 6 weeks as well.  These may be removed at night while sleeping.  You have been instructed in the signs and symptoms of deep venous thrombosis (DVT), including: calf swelling, pain or redness, fever, chills, or sweats.  You have been instructed on the signs and symptoms of pulmonary embolism (PE), including: chest pain or shortness of breath.  Should you develop any of the above symptoms, you should contact us immediately and/or visit the local urgent care or emergency room.     PAIN  MEDICATION - Oxycodone   Oxycodone, a narcotic pain medication, can cause numerous side effects including nausea, constipation, sedation, and confusion.  Narcotic pain medication should be used for 1 to 2 weeks after surgery.  It is recommended that a stool softener be used while taking this medication.  Any over-the-counter stool softener or laxative, such as Colace, Dulcolax, or Senokot, is appropriate.  At any time, acetaminophen (Tylenol) may be used for pain control.  One gram of Tylenol can be taken safely in most patients every 6 hours.  Do not drive, operate heavy machinery, ride motorcycles or ATVs, drink alcohol, or take other drugs that make you tired or sleepy while taking narcotic pain medications.  You should wean off of the narcotic pain medications as tolerated.  We expect you would be COMPLETELY off narcotic pain medications by 2-3 weeks after surgery.     PROBLEMS   Reasons to contact Dr. Mendoza's office immediately include:   Fevers over 101.5F consistently, chills, sweats   Increased drainage from or swelling around the incisions   Excessive bleeding (dressing is saturated)   Excessive redness around the incisions   Discomfort and/or swelling of the lower leg and calf   Chest pain and/or shortness of breath   Pain not controlled by pain medications   Coolness, swelling, and/or decreased sensation in the operative extremity   Persistent nausea or vomiting    Discharge Instructions    Discharged to home by car with relative. Discharged via wheelchair, hospital escort: Yes.  Special equipment needed: Immobilizer    Be sure to schedule a follow-up appointment with your primary care doctor or any specialists as instructed.     Discharge Plan:   Influenza Vaccine Indication: Patient Refuses, Not indicated: Previously immunized this influenza season and > 8 years of age (had flu shot last 10/01/16)  Influenza Vaccine Given - only chart on this line when given:  (Refused)    I understand that a diet  low in cholesterol, fat, and sodium is recommended for good health. Unless I have been given specific instructions below for another diet, I accept this instruction as my diet prescription.   Other diet: Regular    Special Instructions: None    · Is patient discharged on Warfarin / Coumadin?   No     · Is patient Post Blood Transfusion?  No    Depression / Suicide Risk    As you are discharged from this [facility] facility, it is important to learn how to keep safe from harming yourself.    Recognize the warning signs:  · Abrupt changes in personality, positive or negative- including increase in energy   · Giving away possessions  · Change in eating patterns- significant weight changes-  positive or negative  · Change in sleeping patterns- unable to sleep or sleeping all the time   · Unwillingness or inability to communicate  · Depression  · Unusual sadness, discouragement and loneliness  · Talk of wanting to die  · Neglect of personal appearance   · Rebelliousness- reckless behavior  · Withdrawal from people/activities they love  · Confusion- inability to concentrate     If you or a loved one observes any of these behaviors or has concerns about self-harm, here's what you can do:  · Talk about it- your feelings and reasons for harming yourself  · Remove any means that you might use to hurt yourself (examples: pills, rope, extension cords, firearm)  · Get professional help from the community (Mental Health, Substance Abuse, psychological counseling)  · Do not be alone:Call your Safe Contact- someone whom you trust who will be there for you.  · Call your local CRISIS HOTLINE 926-5262 or 989-859-2142  · Call your local Children's Mobile Crisis Response Team [place] (444) 908-0707 or www.Dotted Block  · Call the toll free National Suicide Prevention Hotlines   · National Suicide Prevention Lifeline 913-611-GBCH (5622)  · National Hope Line Network 800-SUICIDE (815-3069)    Follow-up Information     1.  Follow up with Ed Mendoza M.D..    Specialty:  Orthopaedics    Contact information    [address] [place] [address]  [phone]           Discharge Medication Instructions:    Below are the medications your physician expects you to take upon discharge:    Review all your home medications and newly ordered medications with your doctor and/or pharmacist. Follow medication instructions as directed by your doctor and/or pharmacist.    Please keep your medication list with you and share with your physician.               Medication List      START taking these medications        Instructions    Morning Afternoon Evening Bedtime    Aspirin 325 MG Tbec   Last time this was given:  325 mg on 5/26/2017  6:07 AM        Take 1 Tab by mouth 2 Times a Day.   Dose:  325 mg                        ondansetron 4 MG Tabs tablet   Commonly known as:  ZOFRAN        Take 1 Tab by mouth every four hours as needed for Nausea/Vomiting.   Dose:  4 mg                        oxycodone immediate-release 5 MG Tabs   Last time this was given:  10 mg on 5/26/2017  9:13 AM   Commonly known as:  ROXICODONE        Take 1 Tab by mouth every four hours as needed (pain).   Dose:  5 mg                          CONTINUE taking these medications        Instructions    Morning Afternoon Evening Bedtime    albuterol 108 (90 BASE) MCG/ACT Aers inhalation aerosol        Inhale 2 Puffs by mouth every 6 hours as needed.   Dose:  2 Puff                             Where to Get Your Medications      You can get these medications from any pharmacy     Bring a paper prescription for each of these medications    - Aspirin 325 MG Tbec  - ondansetron 4 MG Tabs tablet  - oxycodone immediate-release 5 MG Tabs            Instructions           Diet / Nutrition:    Follow any diet instructions given to you by your doctor or the dietician, including how much salt (sodium) you are allowed each day.    If you are overweight, talk to your doctor about a  weight reduction plan.    Activity:    Remain physically active following your doctor's instructions about exercise and activity.    Rest often.     Any time you become even a little tired or short of breath, SIT DOWN and rest.    Worsening Symptoms:    Report any of the following signs and symptoms to the doctor's office immediately:    *Pain of jaw, arm, or neck  *Chest pain not relieved by medication                               *Dizziness or loss of consciousness  *Difficulty breathing even when at rest   *More tired than usual                                       *Bleeding drainage or swelling of surgical site  *Swelling of feet, ankles, legs or stomach                 *Fever (>100ºF)  *Pink or blood tinged sputum  *Weight gain (3lbs/day or 5lbs /week)           *Shock from internal defibrillator (if applicable)  *Palpitations or irregular heartbeats                *Cool and/or numb extremities    Stroke Awareness    Common Risk Factors for Stroke include:    Age  Atrial Fibrillation  Carotid Artery Stenosis  Diabetes Mellitus  Excessive alcohol consumption  High blood pressure  Overweight   Physical inactivity  Smoking    Warning signs and symptoms of a stroke include:    *Sudden numbness or weakness of the face, arm or leg (especially on one side of the body).  *Sudden confusion, trouble speaking or understanding.  *Sudden trouble seeing in one or both eyes.  *Sudden trouble walking, dizziness, loss of balance or coordination.Sudden severe headache with no known cause.    It is very important to get treatment quickly when a stroke occurs. If you experience any of the above warning signs, call 911 immediately.                   Disclaimer         Quit Smoking / Tobacco Use:    I understand the use of any tobacco products increases my chance of suffering from future heart disease or stroke and could cause other illnesses which may shorten my life. Quitting the use of tobacco products is the single most  important thing I can do to improve my health. For further information on smoking / tobacco cessation call a Toll Free Quit Line at 1-930.992.2942 (*[employer] [place]) or 1-372.503.3169 (American Lung Association) or you can access the web based program at www.lungusa.org.    [place] Tobacco Users Help Line:  (735) 151-4259       Toll Free: 1-267.656.2131  Quit Tobacco Program [facility] (813)583-2739    Crisis Hotline:    [place] Crisis Hotline:  0-053-JCUFAUM or 1-145.972.3641    [place] Crisis Hotline:    1-399.558.2831 or 971-878-9463    Discharge Survey:   Thank you for choosing [facility]. We hope we did everything we could to make your hospital stay a pleasant one. You may be receiving a phone survey and we would appreciate your time and participation in answering the questions. Your input is very valuable to us in our efforts to improve our service to our patients and their families.        My signature on this form indicates that:    1. I have reviewed and understand the above information.  2. My questions regarding this information have been answered to my satisfaction.  3. I have formulated a plan with my discharge nurse to obtain my prescribed medications for home.                  Disclaimer         __________________________________                     __________       ________                       Patient Signature                                                 Date                    Time

## 2017-05-26 VITALS
TEMPERATURE: 98.2 F | OXYGEN SATURATION: 99 % | HEART RATE: 92 BPM | SYSTOLIC BLOOD PRESSURE: 121 MMHG | BODY MASS INDEX: 35.83 KG/M2 | WEIGHT: 264.55 LBS | RESPIRATION RATE: 20 BRPM | HEIGHT: 72 IN | DIASTOLIC BLOOD PRESSURE: 85 MMHG

## 2017-05-26 PROCEDURE — 700112 HCHG RX REV CODE 229: Performed by: PHYSICIAN ASSISTANT

## 2017-05-26 PROCEDURE — A9270 NON-COVERED ITEM OR SERVICE: HCPCS | Performed by: PHYSICIAN ASSISTANT

## 2017-05-26 PROCEDURE — G8989 SELF CARE D/C STATUS: HCPCS | Mod: CK

## 2017-05-26 PROCEDURE — G8978 MOBILITY CURRENT STATUS: HCPCS | Mod: CK

## 2017-05-26 PROCEDURE — 700105 HCHG RX REV CODE 258

## 2017-05-26 PROCEDURE — G8988 SELF CARE GOAL STATUS: HCPCS | Mod: CK

## 2017-05-26 PROCEDURE — 97161 PT EVAL LOW COMPLEX 20 MIN: CPT

## 2017-05-26 PROCEDURE — 700102 HCHG RX REV CODE 250 W/ 637 OVERRIDE(OP): Performed by: PHYSICIAN ASSISTANT

## 2017-05-26 PROCEDURE — G8987 SELF CARE CURRENT STATUS: HCPCS | Mod: CK

## 2017-05-26 PROCEDURE — G8979 MOBILITY GOAL STATUS: HCPCS | Mod: CK

## 2017-05-26 PROCEDURE — 700111 HCHG RX REV CODE 636 W/ 250 OVERRIDE (IP)

## 2017-05-26 PROCEDURE — 97165 OT EVAL LOW COMPLEX 30 MIN: CPT | Mod: XE

## 2017-05-26 PROCEDURE — G0378 HOSPITAL OBSERVATION PER HR: HCPCS

## 2017-05-26 PROCEDURE — G8980 MOBILITY D/C STATUS: HCPCS | Mod: CK

## 2017-05-26 RX ORDER — ONDANSETRON 4 MG/1
4 TABLET, FILM COATED ORAL EVERY 4 HOURS PRN
Qty: 30 TAB | Refills: 0 | Status: SHIPPED | OUTPATIENT
Start: 2017-05-26 | End: 2020-01-02

## 2017-05-26 RX ORDER — ASPIRIN 325 MG
325 TABLET, DELAYED RELEASE (ENTERIC COATED) ORAL 2 TIMES DAILY
Qty: 60 TAB | Refills: 0 | Status: SHIPPED | OUTPATIENT
Start: 2017-05-26 | End: 2020-01-02

## 2017-05-26 RX ORDER — OXYCODONE HYDROCHLORIDE 5 MG/1
5 TABLET ORAL EVERY 4 HOURS PRN
Qty: 50 TAB | Refills: 0 | Status: SHIPPED | OUTPATIENT
Start: 2017-05-26 | End: 2020-01-02

## 2017-05-26 RX ADMIN — ASPIRIN 325 MG: 325 TABLET, DELAYED RELEASE ORAL at 06:07

## 2017-05-26 RX ADMIN — CELECOXIB 200 MG: 200 CAPSULE ORAL at 08:24

## 2017-05-26 RX ADMIN — OXYCODONE HYDROCHLORIDE 5 MG: 5 TABLET ORAL at 06:07

## 2017-05-26 RX ADMIN — CEFAZOLIN 2 G: 1 INJECTION, POWDER, FOR SOLUTION INTRAVENOUS at 06:08

## 2017-05-26 RX ADMIN — DOCUSATE SODIUM 100 MG: 100 CAPSULE, LIQUID FILLED ORAL at 08:24

## 2017-05-26 RX ADMIN — OXYCODONE HYDROCHLORIDE 10 MG: 10 TABLET ORAL at 09:13

## 2017-05-26 RX ADMIN — ACETAMINOPHEN 1000 MG: 500 TABLET ORAL at 06:07

## 2017-05-26 ASSESSMENT — PAIN SCALES - GENERAL
PAINLEVEL_OUTOF10: 2
PAINLEVEL_OUTOF10: 3
PAINLEVEL_OUTOF10: 2

## 2017-05-26 ASSESSMENT — GAIT ASSESSMENTS
DISTANCE (FEET): 150
ASSISTIVE DEVICE: CRUTCHES
GAIT LEVEL OF ASSIST: SUPERVISED

## 2017-05-26 ASSESSMENT — ACTIVITIES OF DAILY LIVING (ADL): TOILETING: INDEPENDENT

## 2017-05-26 NOTE — FLOWSHEET NOTE
05/25/17 2020   Events/Summary/Plan   Events/Summary/Plan IS   Incentive Spirometry Group   Incentive Spirometry Instruction Yes   Breathing Exercises Yes   Incentive Spirometer Volume 4000 mL   Incentive Spirometer Date Last Changed 05/25/17   Incentive Spirometer Next Change Date (Q 30 Days) 06/24/17   Chest Exam   Respiration 16   Pulse (!) 120   Oxygen   Pulse Oximetry 98 %   O2 Daily Delivery Respiratory  Room Air with O2 Available   Room Air Challenge Pass

## 2017-05-26 NOTE — OR NURSING
1732: To PACU from OR via gurney, sleeping, respirations spontaneous and non-labored via OPA, NC@ 6L blow-by. Dressing to RLE CDI with immobilizer in place, pedal pulse 2+, cap refill < 3 secs.  1738: awakens, OPA d/lisa, NC applied @ 3L. Wiggles toes, plantar/dorsi flexion intact.  1745: Elevated RLE on pillow. Pain 3/10, plan to give oral pain meds. Sips of water given.  1800: medicated for pain 4/10. Decreased sensation to right leg post nerve block.  1815: sips of water given.  1820: pain 4/10, medicated.  1828: pain continues, medicated.  1850: pain tolerable 4/10. Patient ready to go to room. Will call report when RN available.  1905: unchanged.  1918: report to DOTTY Lamar. Will transfer via bed to room.

## 2017-05-26 NOTE — PROGRESS NOTES
Pt with right leg immobilized, underneath w/. Original surgical dressing.2 RN skin assessment done; skin not WDL. See Wound flowsheet.

## 2017-05-26 NOTE — THERAPY
"Occupational Therapy Evaluation completed.   Functional Status:  Has been up with nursing on crutches to BR, and observed walking with PT with SBA with crutches.  Pt has h/o multiple knee surgeries to same leg.  He demonstrated being able to reach to both feet for LB dressing from long sitting in bed.  Pt has someone home 24 hours as needed for assist.  Pt's home is handicap accessible with walk in shower, grab bars, built in seat, and bars by the toilet.  Do not anticipate any further OT needs at this time.    Plan of Care: Patient with no further skilled OT needs in the acute care setting at this time  Discharge Recommendations:  Equipment: No Equipment Needed. Post-acute therapy Discharge to home with outpatient or home health for additional skilled therapy services.    See \"Rehab Therapy-Acute\" Patient Summary Report for complete documentation.    "

## 2017-05-26 NOTE — PROGRESS NOTES
Pt arrived from ER dept,via bed,  alert and awake. Pt denies any discomfort at this time. Pts Dad at bedside,updates for POC given by assigned RN. Call light use instruction given. W ill continue to monitor.

## 2017-05-26 NOTE — CARE PLAN
Problem: Safety  Goal: Will remain free from falls  Outcome: PROGRESSING AS EXPECTED  Intervention: Implement fall precautions  Call light within reach; fall precautions in place; pt instructed to call before ambulating OOB; verbalizes understanding and agrees to call. Pt is a stby-assist to BR w/ crutches; tolerates very well.       Problem: Pain Management  Goal: Pain level will decrease to patient’s comfort goal  Outcome: PROGRESSING AS EXPECTED  Intervention: Follow pain managment plan developed in collaboration with patient and Interdisciplinary Team  Oxycodone ordered PRN for pain; pt tolerating well; required one dose of IV Dilaudid for breakthrough pain, states pain has decreased.

## 2017-05-26 NOTE — DISCHARGE INSTRUCTIONS
CONTACT INFORMATION   If you have any questions or concerns, please contact Allison Martin (medical assistant to Dr. Mendoza) at the [facility] ([facility]) at [phone] during normal business hours (Monday-Friday 8:00AM-5:00PM) or the main office number at [phone] after normal business hours.     DIET   Resume your regular diet slowly and as tolerated.     MOBILITY LOWER EXTREMITY   You should remain toe-touch/touch-down weightbearing on the operative extremity, using a walker or crutches for assistance with ambulation.  You should keep your lower extremity elevated at or above the level of your heart to help decrease pain and swelling.  You are to keep your heel off the bed at all times to prevent pressure ulcers from forming.  You will continue with physical therapy and rehabilitation exercises as described in the hospital.  DO NOT place pillows or blankets underneath the operative knee.  Placing pillows or blankets underneath the operative knee will cause your knee to be bent/flexed for long periods of time, making it difficult to get the knee straight/fully extended after surgery.     SHOWERING/DRESSING   You have a surgical incision(s).  You will see stitches or staples covering the incisions - these will stay in until your first postoperative appointment.  Once the surgical dressing has been removed, you may shower as normal.  Let water run freely over the incisions and then pat the area dry with a clean towel.  You may then leave the incisions open to the air, or you may cover them with a simple fresh dry dressing and/or ACE wrap.  DO NOT scrub the incisions.  DO NOT apply soap, ointments, lotions, or Bacitracin on the incisions for at least 6 weeks after surgery.  DO NOT submerge the incisions in a bath, pool, river, hot tub, lake, etc. For at least 6 weeks after surgery.     ICING   Apply ice to the operative area near full time for the first 5 days after surgery.  Be sure to have a  surgical dressing or towel between the ice and skin.  After 5 days, you should apply ice for only 10 minutes 2-3 times per day.       DRIVING   You should not operate a motor vehicle until you have met the following conditions: you have not required narcotic pain medications for at least 24 hours, your pain is well controlled with over-the-counter medications (such as Tylenol), you are Toe Touch Weight Bearing on surgical leg, you are comfortable wearing a seatbelt, and you are comfortable getting in and out of the vehicle.       BLOOD CLOT PROPHYLAXIS - Aspirin   You should take enteric-coated aspirin (such as Ecotrin) 325 mg twice daily for 6 weeks.  You should also take Prilosec 40 mg daily or Pepcid 20 mg twice daily when taking the enteric-coated aspirin (both of these medications can be purchased over-the-counter).  You should wear the PARRIS hose provided in the hospital for a total of 6 weeks as well.  These may be removed at night while sleeping.  You have been instructed in the signs and symptoms of deep venous thrombosis (DVT), including: calf swelling, pain or redness, fever, chills, or sweats.  You have been instructed on the signs and symptoms of pulmonary embolism (PE), including: chest pain or shortness of breath.  Should you develop any of the above symptoms, you should contact us immediately and/or visit the local urgent care or emergency room.     PAIN MEDICATION - Oxycodone   Oxycodone, a narcotic pain medication, can cause numerous side effects including nausea, constipation, sedation, and confusion.  Narcotic pain medication should be used for 1 to 2 weeks after surgery.  It is recommended that a stool softener be used while taking this medication.  Any over-the-counter stool softener or laxative, such as Colace, Dulcolax, or Senokot, is appropriate.  At any time, acetaminophen (Tylenol) may be used for pain control.  One gram of Tylenol can be taken safely in most patients every 6 hours.  Do not  drive, operate heavy machinery, ride motorcycles or ATVs, drink alcohol, or take other drugs that make you tired or sleepy while taking narcotic pain medications.  You should wean off of the narcotic pain medications as tolerated.  We expect you would be COMPLETELY off narcotic pain medications by 2-3 weeks after surgery.     PROBLEMS   Reasons to contact Dr. Mendoza's office immediately include:   Fevers over 101.5F consistently, chills, sweats   Increased drainage from or swelling around the incisions   Excessive bleeding (dressing is saturated)   Excessive redness around the incisions   Discomfort and/or swelling of the lower leg and calf   Chest pain and/or shortness of breath   Pain not controlled by pain medications   Coolness, swelling, and/or decreased sensation in the operative extremity   Persistent nausea or vomiting    Discharge Instructions    Discharged to home by car with relative. Discharged via wheelchair, hospital escort: Yes.  Special equipment needed: Immobilizer    Be sure to schedule a follow-up appointment with your primary care doctor or any specialists as instructed.     Discharge Plan:   Influenza Vaccine Indication: Patient Refuses, Not indicated: Previously immunized this influenza season and > 8 years of age (had flu shot last 10/01/16)  Influenza Vaccine Given - only chart on this line when given:  (Refused)    I understand that a diet low in cholesterol, fat, and sodium is recommended for good health. Unless I have been given specific instructions below for another diet, I accept this instruction as my diet prescription.   Other diet: Regular    Special Instructions: None    · Is patient discharged on Warfarin / Coumadin?   No     · Is patient Post Blood Transfusion?  No    Depression / Suicide Risk    As you are discharged from this [facility] facility, it is important to learn how to keep safe from harming yourself.    Recognize the warning signs:  · Abrupt changes in  personality, positive or negative- including increase in energy   · Giving away possessions  · Change in eating patterns- significant weight changes-  positive or negative  · Change in sleeping patterns- unable to sleep or sleeping all the time   · Unwillingness or inability to communicate  · Depression  · Unusual sadness, discouragement and loneliness  · Talk of wanting to die  · Neglect of personal appearance   · Rebelliousness- reckless behavior  · Withdrawal from people/activities they love  · Confusion- inability to concentrate     If you or a loved one observes any of these behaviors or has concerns about self-harm, here's what you can do:  · Talk about it- your feelings and reasons for harming yourself  · Remove any means that you might use to hurt yourself (examples: pills, rope, extension cords, firearm)  · Get professional help from the community (Mental Health, Substance Abuse, psychological counseling)  · Do not be alone:Call your Safe Contact- someone whom you trust who will be there for you.  · Call your local CRISIS HOTLINE 784-4647 or 459-116-1151  · Call your local Children's Mobile Crisis Response Team [place] (427) 630-5007 or www.[employer]  · Call the toll free National Suicide Prevention Hotlines   · National Suicide Prevention Lifeline 751-102-JYMN (9134)  · National Hope Line Network 800-SUICIDE (505-9760)

## 2017-05-26 NOTE — PROGRESS NOTES
Pt A&Ox4, sitting up in bed w/ moderate c/o pain to R knee; medicated appropriately per MAR. Dressing to R knee CDI, immobilizer and ice pack in place. +CMS to RLE able to wiggle toes and plantar/dorsiflex R foot, sensation intact. Pt denies numbness/tingling, C/P, SOB, lightheadedness and dizziness. Fall precautions in place; call light within reach, hourly rounding. Pt instructed to call before ambulating OOB; pt has voided adequately, eaten a regular dinner and tolerated well. No further needs at this time; encouraged use of IS 10x per hour while awake.

## 2017-05-26 NOTE — PROGRESS NOTES
Subjective  Patient is now postoperative day #1 status post LLE multilig reconstruction.  Patient did well overnight - no acute events or issues.  Pain is currently well controlled.  Patient denies any current or recent subjective numbness, tingling, weakness, fevers, chills, nausea, vomiting, chest pain, or shortness of breath.      Physical Exam  General: Patient is resting comfortably in bed.  No acute distress.    Upper Extremity: Surgical dressing is clean, dry, and intact.  Surgical incision is clean, dry, and intact with sutures in place and no evidence of erythema, drainage, or bleeding.  Patient clearly fires deltoids, biceps, triceps, wrist flexors, wrist extensors, and hand intrinsics.  Sensation is intact to light touch throughout median, ulnar, and radial nerve distributions.  Strong and palpable 2+ radial pulse with capillary refill less than 2 seconds.      Physical Exam  General: Patient is resting comfortably in bed.  No acute distress.    Lower Extremity: Surgical dressing is clean, dry, and intact.  Surgical incision is clean, dry, and intact with sutures in place and no evidence of erythema, drainage, or bleeding.  Patient clearly fires tibialis anterior, EHL, and gastrocnemius/soleus.  Sensation is intact to light touch throughout superficial peroneal, deep peroneal, and tibial nerve distributions.  Strong and palpable 2+ dorsalis pedis and posterior tibial pulses with capillary refill less than 2 seconds.  No lower leg tenderness or discomfort.    Blood pressure 119/86, pulse 105, temperature 36.7 °C (98.1 °F), resp. rate 20, height 1.829 m (6'), weight 120 kg (264 lb 8.8 oz), SpO2 100 %.                      Intake/Output Summary (Last 24 hours) at 05/26/17 6119  Last data filed at 05/26/17 0425   Gross per 24 hour   Intake   2960 ml   Output   2150 ml   Net    810 ml       Assessment  - Postoperative day 1 status post LLE multi-lig reconstruction - doing well    Plan  Discharge to home today.  Follow up already scheduled appointment with Dr. Mendoza. Call [facility] with questions.  Oxycodone 5mg 1-2 PO Q 4-6 hrs prn pain.  Zofran 4mg 1 PO Q 8 prn nausea. ASA 325mg 1 BID.   DVT Prophylaxis: DVT: Mechanical (SCDs, compressive stockings) and pharmacologic - 325mg ASA BID  Diet: Advance as tolerated  Activity/PT: TTWB LLE  Disposition: Home

## 2017-05-26 NOTE — PROGRESS NOTES
Received report from Margret STORM, assumed pt care. Pt A&Ox4, sitting up in bed. Dressing to L knee CDI, +CMS, cap refill less than 3 seconds, pt able to plantar/dorsi flex ndependently w/o difficulty. Immobilizer in place to L knee, elevated on pillows. Pt taught to inform nurse if experiencing pain above comfort goal, SOB, chest pain, ambulating out of bed, or for any further assistance. Fall precautions in place, call light within reach. Will continue with care.

## 2017-05-26 NOTE — PROGRESS NOTES
Discharging patient home per MD order.  Pt demonstrated understanding of discharge instructions, follow up appointments, home medications, prescriptions, home care for surgical wound, and nursing care instructions for ACL/MCL repair.  Ambulating without assistance and use of crutches, voiding without difficulty, pain well controlled, tolerating oral medications, oxygen saturation greater than 90% , tolerating diet. Pt verbalized understanding of discharge instructions and educational handouts, all questions answered.  Pt discharged off unit with hospital escort at 1123.

## 2017-05-30 NOTE — OP REPORT
DATE OF SERVICE:  05/25/2017    DATE OF PROCEDURE:  05/25/2017    SURGEON:  Ed Mendoza MD      ASSISTANTS:    1.  Jayro Babb MD    2.  JOSE Jordan      ANESTHESIA:  General anesthesia with single shot adductor canal block.      PREOPERATIVE DIAGNOSES:    1.  Right knee anterior cruciate ligament tear.    2.  Right knee posterior cruciate ligament tear.    3.  Right knee medial collateral ligament tear.    4.  Right knee lateral meniscus tear.    5.  Right knee symptomatic intraarticular hardware/deep implants.    6.  Right knee synovitis.      POSTOPERATIVE DIAGNOSES:    1.  Right knee anterior cruciate ligament tear.    2.  Right knee posterior cruciate ligament tear.    3.  Right knee medial collateral ligament tear.    4.  Right knee lateral meniscus tear.    5.  Right knee symptomatic intraarticular hardware/deep implants.    6.  Right knee synovitis.      PROCEDURES PERFORMED:    1.  Right knee arthroscopy.    2.  Right knee partial lateral meniscectomy.    3.  Right knee extensive synovectomy.    4.  Right knee open removal of hardware/deep implants.    5.  Right knee open ACL reconstruction.    6.  Right knee open PCL reconstruction.    7.  Right knee open MCL reconstruction.      INDICATIONS:  Treat right knee multi-ligamentous injury, improve pain, improve   function.      HISTORY OF PRESENT ILLNESS:  The patient is a very pleasant and active   31-year-old male who was bucked off his horse several months ago and sustained   a fracture dislocation of his right knee.  The patient is treated with a   closed reduction and external fixation.  Over the last several months, has   been increasing his range of motion with noted persistent apprehension and   instability as well as some lateral subluxation of the tibia relative to the   femur.  As a result, we discussed surgical intervention.  The patient has been   n.p.o. since midnight.  He is medically cleared for surgery by the anesthesia    team.      INFORMED CONSENT:  The patient informed of the risks, benefits, alternatives   of planned operation.  The risks include but not limited to bleeding,   infection, neurovascular damage, coy-incisional numbness, recurrent   ligamentous injury osteoarthritis/cartilage damage, recurrent meniscus tear,   pain, stiffness, DVT, PE, MI, stroke, and death.  Advanced directives were   reviewed.  After answering all questions, the patient elected planned   operation and informed consent form was signed.      IMPLANTS:    1.  [employer]hew Ultrabutton x2.    2.  Guzman [employer]hew 4.5 mm footprint anchor x2.    3.  Guzman [employer]hew 10x25 mm BioComposite interference screw x2.    4.  Guzman [employer]hew 9x25 mm BioComposite interference screw.    5.  Arthrex 4.5x42.5 mm screw/post.    6.  Arthrex 18 mm spiked washer.      DESCRIPTION OF PROCEDURE:  Patient was identified in the preoperative holding   area.  The correct procedural side and site were identified and marked.  The   patient received a single shot adductor canal block under ultrasound guidance   by the anesthesia team.  He was then brought to the operating room and   transferred to the operating room table where all bony prominences were well   padded.  Patient underwent general anesthesia by the anesthesia team.    Examination of the right knee demonstrated well-healed incision consistent   with his previous surgeries.  There was no obvious effusion.  Range of motion   was from 0 to about 105 degrees.  There was a positive Lachman test without an   endpoint as well as a positive pivot shift.  There was a positive posterior   drawer without an endpoint consistent with a grade III injury.  There was   obvious opening with valgus stress at both 0 and 30 degrees.  There was a   negative dial test and no obvious instability to varus stress.  There was   obvious lateral tibial subluxation.  A tourniquet was applied to the right   upper thigh.      The right knee  was then cleaned with several alcohol soaked gauzes and the   joint was injected with 30 mL of 1% lidocaine with epinephrine via lateral   parapatellar injection site.  The right lower extremity was then prepped and   draped in a normal standard sterile fashion.      A procedural pause was then performed by the operating room team.  The   procedure, patient's identity, the operative side, the surgical site, and the   procedure to be performed were all verified.  The patient was given IV   antibiotics prior to incision.    I then began with a diagnostic arthroscopy via standard anteromedial and   anterolateral portals.  There was some diffuse synovitis throughout the   suprapatellar pouch.  There was some evidence of some grade III cartilage   changes of the patellofemoral compartment.  No obvious loose bodies or soft   tissue debris within the medial and lateral gutters.  Examination of the notch   demonstrated obvious disruption of both the ACL and PCL.  There was evidence   of the previously placed Acutrak screw within the tibial spine.  Examination   of the medial compartment did demonstrate no obvious tears of the medial   meniscus.  There was some diffuse grade II changes over the medial femoral   condyle and medial tibial plateau.  Examination of the lateral compartment   demonstrated evidence of a previous lateral meniscectomy consistent with his   previous surgery.  There was some extensive grade III cartilage changes over   both the lateral femoral condyle and lateral tibial plateau.  There was also   evidence of a large area of full-thickness grade IV cartilage changes over the   medial aspect of the lateral femoral condyle.  There was also extensive   synovitis of the infrapatellar fat pad.      Examination of the _____ medial compartment once again demonstrated some   obvious opening, about 12 mm of the medial compartment with valgus stress.      Given the findings at the time of the diagnostic  arthroscopy, I elected to   planned operation consisting of a right knee arthroscopy, extensive   synovectomy, ACL reconstruction, PCL reconstruction, MCL reconstruction,   removal of hardware and partial lateral meniscectomy.      I first turned my attention to the synovitis throughout the suprapatellar   pouch and infrapatellar fat pad.  This was debrided with the use of the   Arthrocare electrocautery wand and the oscillating suction shaver device.  I   then turned my attention to the lateral compartment.  I performed a partial   lateral meniscectomy removing some of the remaining fibers of the body of the   lateral meniscus.  Probing the remaining tissue demonstrated no obvious loose   or unstable fragments.  The popliteus appeared to be intact.  I then turned my   attention to the remaining fibers of the ACL and PCL.  These were debrided.    As I moved posteriorly a little bit concerned about possibly getting into the   posterior vascular structures given the subluxation of the tibia relative to   the femur.  In addition, we percutaneously tried to remove the prominent   Acutrak screws, but we were unable to as a result of the trajectory.  As a   result, we elected to proceed with an open ligamentous reconstruction and   removal of hardware.      The right lower extremity was elevated and the tourniquet inflated to 250   mmHg.  A longitudinal incision was made over the anterior aspect of the knee   joint and a medial parapatellar arthrotomy was performed.  The patella was   subluxed laterally, quite a bit of the scar tissue just posterior to the   patellar tendon was debrided.  We then localized the 2 Acutrak screws.  These   were easily removed from the joint.  I then used the Bovie to carefully   dissect over the posterior aspect of the proximal tibia.  I was able to   identify the posterior aspect of the tibial facet.  The arthroscopic tibial   PCL guide was then placed over the posterior aspect of the knee  joint under   direct visualization.  The metal guide was then placed on to the bone of the   proximal tibia.  The guidepin was then drilled and the tibia and the guide   removed.  Fluoroscopy was then brought in and a perfect lateral view of the   knee was performed to confirm satisfactory position of the guidepin.  I then   placed the metal guide back on to the posterior aspect of the tibia to protect   the neurovascular structures.  The tunnel within the proximal tibia was then   created measuring about 10 mm in size.  A Vida smoother was then passed into   the tunnel and retrieved.  I then turned my attention to the femoral tunnel   for the PCL reconstruction.  The femoral PCL guide was then placed over the   anatomic insertion of the PCL on the medial femoral condyle.  The guidepin was   then drilled in the center of the footprint and a 10-mm tunnel was then   reamed.  A Vida smoother was then pulled into the femoral tunnel without   difficulty.  Meanwhile, the PCL Achilles tendon allograft was then prepared on   the back table.  The bone block measured 10x20 mm and the soft tissue portion   of the graft measured about 10 mm in diameter.  The graft was then passed in   a retrograde fashion until the bone block was seated nicely within the tibial   tunnel.  This was then secured in position with a single 10x25 mm BioComposite   interference screw resulting in excellent fixation.      I then turned my attention to the ACL reconstruction part of the case.  The   soft tissue allograft was prepared with a Smith and Nephew Ultrabutton and the   graft measured about 10x90 mm.  The knee was then flexed to 110 degrees of   flexion and a 7 mm offset guide was placed up against the remaining fibers of   the femoral footprint.  The guidepin was then inserted in the lateral femoral   condyle and a 10 mm tunnel was reamed to a depth of about 25 mm.  The knee was   then relaxed to 90 degrees of flexion.  The ACL tibial guide  was then placed   on the remaining fibers of the tibial ACL footprint.  The metal guide was then   placed on to the bone of the proximal tibia about 1.5 cm proximal to the PCL   tunnel.  The guidepin was then inserted into the center of the tibial   footprint and a 10 mm tunnel was reamed.  The sutures were then passed in a   standard fashion.  The ACL allograft was then passed into the joint in a   retrograde fashion.  I first dock the Ultrabutton against the lateral femoral   cortex.  This was confirmed on direct visualization.  The sutures were then   pulled until I had about 20 mm of graft in the femoral tunnel.  The tibial   site of the ACL graft is not secured at this time.      We then turned attention to the MCL reconstruction.  I was able to palpate the   medial side of the distal femur until I localized the medial epicondyle.  The   guidepin was placed and the position was confirmed with a perfect lateral   fluoroscopy view.  The Beath pin was inserted into the distal femur and   overreamed with an Endobutton reamer followed by a 10 mm tunnel to a depth of   about 50 mm.  I then turned my attention to over the superficial MCL insertion   on the medial aspect of the tibia.  Meanwhile, we then prepared a soft tissue   allograft with an Ultrabutton for the MCL reconstruction.  The Ultrabutton   was passed through the distal femur and docked on the lateral aspect of the   femoral cortex under direct visualization.  The MCL graft was then docked in   the femoral tunnel to a depth of about 15 mm.  The tails were then pulled   along the native fibers of the superficial MCL and pulled out through the   distal tibial incision.  We then identified the anatomic insertion of the   superficial MCL on the proximal tibia.  The guidepin was placed and confirmed   to be in satisfactory position under both fluoroscopy view and direct   visualization.  We then measured the length and placed the 4.5x42.5 mm Arthrex    screw/post with an 18 mm spiked washer.      I then turned my attention back to the femoral tunnel of the PCL   reconstruction.  With the knee flexed to 90 degrees and anterior drawer   applied, _____ was pulled on the PCL graft.  This was fixed on the distal   femur with a 10x25 mm BioComposite interference screw.  This was then backed   up with a single 4.5 mm Smith and Nephew footprint anchor on the medial aspect   of the distal femur.  I then turned attention to the ACL.  With the knee in   about 5 degrees short of full extension and gentle posterior drawer applied,   tension was applied to the ACL graft through the tibial tunnel and a 9x25 mm   BioComposite interference screw was placed along the posterior aspect of the   tibial tunnel resulting in excellent fixation of the ACL graft.  I then backed   up the tibial fixation for both the ACL and PCL grafts with a single 4.5 mm   footprint onto the proximal tibia.  I then turned my attention to the distal   attachment of the MCL.  With the knee in about 30 degrees of flexion and a   gentle varus stress applied, the tails of the allograft tissue were then   wrapped around the screw/spiked washer and this was then tightened onto the   proximal tibia with excellent bite.  Examination of the knee at this time   noted range of motion from 0-110 degrees.  There was negative Lachman test,   negative posterior drawer and no instability to valgus stress at 0 and 30.    The tourniquet was then released and meticulous hemostasis obtained.  Total   tourniquet time was 84 minutes.  We then closed the arthrotomy with   interrupted #1 Vicryls.  The incision was then closed in a layered fashion   with interrupted 2-0 Monocryl, followed by staples.  A sterile compressive   dressing was then applied followed by a PARRIS hose stocking and a hinged knee   brace locked in full extension with a gentle varus mold.  I also placed   several towels over the posterior aspect of the lower leg  to prevent stress on   the PCL reconstruction.      Needle and sponge counts were correct at the end of the procedure as reported   to the circulating nurse.  The patient tolerated the procedure well with no   obvious intraoperative complications.  The patient was then transferred off   the operating room table onto the regular hospital bed and was extubated by   the anesthesia team.      Jayro aBbb MD and JOSE Jordan were medically necessary during the   procedure for graft preparation, assisting with tunnel preparation and   intraoperative decision making.      ESTIMATED BLOOD LOSS:  50 mL.      COMPLICATIONS:  None.      TOURNIQUET TIME:  84 minutes.      SPECIMENS:  None.      WOUND TYPE:  Type 1 clean.      POSTOPERATIVE PLAN:  The patient will be transferred back to the postoperative   unit and will be admitted to the hospital for overnight observation.  Patient   will remain touchdown weightbearing on the right lower extremity with the use   of crutches and a brace in place.  We will initiate physical therapy in 5-7   days.  The patient will take aspirin 325 mg p.o. b.i.d. for a total of 1 month   following surgery for DVT prophylaxis.       ____________________________________     MD NIKHIL Larsen / SVITLANA    DD:  05/30/2017 06:36:21  DT:  05/30/2017 09:02:10    D#:  [id number]  Job#:  [id number]

## 2019-12-04 ENCOUNTER — APPOINTMENT (OUTPATIENT)
Dept: ADMISSIONS | Facility: MEDICAL CENTER | Age: 34
End: 2019-12-04
Payer: MEDICARE

## 2020-01-02 DIAGNOSIS — Z01.812 PRE-OPERATIVE LABORATORY EXAMINATION: ICD-10-CM

## 2020-01-02 DIAGNOSIS — Z01.810 PRE-OPERATIVE CARDIOVASCULAR EXAMINATION: ICD-10-CM

## 2020-01-02 LAB
ANION GAP SERPL CALC-SCNC: 12 MMOL/L (ref 0–11.9)
APTT PPP: 27 SEC (ref 24.7–36)
BUN SERPL-MCNC: 16 MG/DL (ref 8–22)
CALCIUM SERPL-MCNC: 9.6 MG/DL (ref 8.5–10.5)
CHLORIDE SERPL-SCNC: 105 MMOL/L (ref 96–112)
CO2 SERPL-SCNC: 25 MMOL/L (ref 20–33)
CREAT SERPL-MCNC: 1.09 MG/DL (ref 0.5–1.4)
EKG IMPRESSION: NORMAL
ERYTHROCYTE [DISTWIDTH] IN BLOOD BY AUTOMATED COUNT: 42.3 FL (ref 35.9–50)
EST. AVERAGE GLUCOSE BLD GHB EST-MCNC: 111 MG/DL
GLUCOSE SERPL-MCNC: 112 MG/DL (ref 65–99)
HBA1C MFR BLD: 5.5 % (ref 0–5.6)
HCT VFR BLD AUTO: 50.1 % (ref 42–52)
HGB BLD-MCNC: 17.4 G/DL (ref 14–18)
INR PPP: 0.96 (ref 0.87–1.13)
MCH RBC QN AUTO: 30.6 PG (ref 27–33)
MCHC RBC AUTO-ENTMCNC: 34.7 G/DL (ref 33.7–35.3)
MCV RBC AUTO: 88.2 FL (ref 81.4–97.8)
PLATELET # BLD AUTO: 285 K/UL (ref 164–446)
PMV BLD AUTO: 10.6 FL (ref 9–12.9)
POTASSIUM SERPL-SCNC: 3.7 MMOL/L (ref 3.6–5.5)
PROTHROMBIN TIME: 12.9 SEC (ref 12–14.6)
RBC # BLD AUTO: 5.68 M/UL (ref 4.7–6.1)
SCCMEC + MECA PNL NOSE NAA+PROBE: NEGATIVE
SCCMEC + MECA PNL NOSE NAA+PROBE: POSITIVE
SODIUM SERPL-SCNC: 142 MMOL/L (ref 135–145)
WBC # BLD AUTO: 7.7 K/UL (ref 4.8–10.8)

## 2020-01-02 PROCEDURE — 85730 THROMBOPLASTIN TIME PARTIAL: CPT

## 2020-01-02 PROCEDURE — 36415 COLL VENOUS BLD VENIPUNCTURE: CPT

## 2020-01-02 PROCEDURE — 85610 PROTHROMBIN TIME: CPT

## 2020-01-02 PROCEDURE — 93005 ELECTROCARDIOGRAM TRACING: CPT

## 2020-01-02 PROCEDURE — 80048 BASIC METABOLIC PNL TOTAL CA: CPT

## 2020-01-02 PROCEDURE — 85027 COMPLETE CBC AUTOMATED: CPT

## 2020-01-02 PROCEDURE — 83036 HEMOGLOBIN GLYCOSYLATED A1C: CPT

## 2020-01-02 PROCEDURE — 87641 MR-STAPH DNA AMP PROBE: CPT

## 2020-01-02 PROCEDURE — 93010 ELECTROCARDIOGRAM REPORT: CPT | Performed by: INTERNAL MEDICINE

## 2020-01-02 PROCEDURE — 87640 STAPH A DNA AMP PROBE: CPT | Mod: XU

## 2020-01-02 RX ORDER — SENNOSIDES 8.6 MG
650 CAPSULE ORAL EVERY 6 HOURS PRN
COMMUNITY
End: 2022-08-16

## 2020-01-02 NOTE — OR NURSING
"Hx and meds reviewed, pre op instructions given. Pt aware may take the listed meds morning of surgery; albuterol and tylenol if needed. Anesthesia fasting guidelines reviewed with pt.     TOTAL JOINT REPLACEMENT SURGERY   PreAdmit Appointment  Pre-Operative Education Note       1) Did you take a Total Joint Replacement Pre-Operative Education class?  Where?  Answer: No    2) If you did not take a class, did you receive pre-op education in the form of a book or through an online class?    Answer: No    3) Have you had the same joint replacement procedure within the last 3 years?   Answer: No    For patients who answered \"No\" to the above questions:     4) Was patient given information on [facility] Pre-Op Education class through the Pre-Op Education Class flyer or the Alternative Pre-op Education flyer?   Answer: Yes    5) Was a [facility] Total Joint Replacement Patient Guide binder handed out?   Answer:Yes    6) Did the patient refuse preoperative education?  Answer:  No  "

## 2020-01-02 NOTE — DISCHARGE PLANNING
DISCHARGE PLANNING NOTE - TOTAL JOINT    Procedure: RTKA  Procedure Date:1/21/2020  Insurance:    Equipment currently available at home? W/c, crutches, cane  Steps into the home? 1  Steps within the home? 0  Toilet height? average  Type of shower? walkin with bars and shower chair  Who will be with you during your recovery? father  Is Outpatient Physical Therapy set up after surgery? Yes  Did you take the Total Joint Class and where? No   Planning same day discharge?No     Plan: Given info on classes and equipment

## 2020-01-21 ENCOUNTER — ANESTHESIA EVENT (OUTPATIENT)
Dept: SURGERY | Facility: MEDICAL CENTER | Age: 35
End: 2020-01-21
Payer: MEDICARE

## 2020-01-21 ENCOUNTER — HOSPITAL ENCOUNTER (OUTPATIENT)
Facility: MEDICAL CENTER | Age: 35
End: 2020-01-22
Attending: ORTHOPAEDIC SURGERY | Admitting: ORTHOPAEDIC SURGERY
Payer: MEDICARE

## 2020-01-21 ENCOUNTER — ANESTHESIA (OUTPATIENT)
Dept: SURGERY | Facility: MEDICAL CENTER | Age: 35
End: 2020-01-21
Payer: MEDICARE

## 2020-01-21 DIAGNOSIS — Z96.651 S/P TKR (TOTAL KNEE REPLACEMENT) USING CEMENT, RIGHT: ICD-10-CM

## 2020-01-21 PROCEDURE — A9270 NON-COVERED ITEM OR SERVICE: HCPCS | Performed by: ORTHOPAEDIC SURGERY

## 2020-01-21 PROCEDURE — 700111 HCHG RX REV CODE 636 W/ 250 OVERRIDE (IP): Performed by: ANESTHESIOLOGY

## 2020-01-21 PROCEDURE — 94760 N-INVAS EAR/PLS OXIMETRY 1: CPT

## 2020-01-21 PROCEDURE — 502240 HCHG MISC OR SUPPLY RC 0272: Performed by: ORTHOPAEDIC SURGERY

## 2020-01-21 PROCEDURE — 160048 HCHG OR STATISTICAL LEVEL 1-5: Performed by: ORTHOPAEDIC SURGERY

## 2020-01-21 PROCEDURE — A9270 NON-COVERED ITEM OR SERVICE: HCPCS | Performed by: ANESTHESIOLOGY

## 2020-01-21 PROCEDURE — 700102 HCHG RX REV CODE 250 W/ 637 OVERRIDE(OP): Performed by: ANESTHESIOLOGY

## 2020-01-21 PROCEDURE — 700105 HCHG RX REV CODE 258: Performed by: ORTHOPAEDIC SURGERY

## 2020-01-21 PROCEDURE — 502000 HCHG MISC OR IMPLANTS RC 0278: Performed by: ORTHOPAEDIC SURGERY

## 2020-01-21 PROCEDURE — 700112 HCHG RX REV CODE 229: Performed by: ORTHOPAEDIC SURGERY

## 2020-01-21 PROCEDURE — 502579 HCHG PACK, TOTAL KNEE: Performed by: ORTHOPAEDIC SURGERY

## 2020-01-21 PROCEDURE — 501838 HCHG SUTURE GENERAL: Performed by: ORTHOPAEDIC SURGERY

## 2020-01-21 PROCEDURE — 96376 TX/PRO/DX INJ SAME DRUG ADON: CPT | Mod: XU

## 2020-01-21 PROCEDURE — 96375 TX/PRO/DX INJ NEW DRUG ADDON: CPT | Mod: XU

## 2020-01-21 PROCEDURE — 96365 THER/PROPH/DIAG IV INF INIT: CPT | Mod: XU

## 2020-01-21 PROCEDURE — 160035 HCHG PACU - 1ST 60 MINS PHASE I: Performed by: ORTHOPAEDIC SURGERY

## 2020-01-21 PROCEDURE — 500002 HCHG ADHESIVE, DERMABOND: Performed by: ORTHOPAEDIC SURGERY

## 2020-01-21 PROCEDURE — 700101 HCHG RX REV CODE 250: Performed by: ORTHOPAEDIC SURGERY

## 2020-01-21 PROCEDURE — 64447 NJX AA&/STRD FEMORAL NRV IMG: CPT | Performed by: ORTHOPAEDIC SURGERY

## 2020-01-21 PROCEDURE — G0378 HOSPITAL OBSERVATION PER HR: HCPCS

## 2020-01-21 PROCEDURE — L8699 PROSTHETIC IMPLANT NOS: HCPCS | Performed by: ORTHOPAEDIC SURGERY

## 2020-01-21 PROCEDURE — 160029 HCHG SURGERY MINUTES - 1ST 30 MINS LEVEL 4: Performed by: ORTHOPAEDIC SURGERY

## 2020-01-21 PROCEDURE — 160009 HCHG ANES TIME/MIN: Performed by: ORTHOPAEDIC SURGERY

## 2020-01-21 PROCEDURE — 700101 HCHG RX REV CODE 250: Performed by: ANESTHESIOLOGY

## 2020-01-21 PROCEDURE — 160041 HCHG SURGERY MINUTES - EA ADDL 1 MIN LEVEL 4: Performed by: ORTHOPAEDIC SURGERY

## 2020-01-21 PROCEDURE — 160002 HCHG RECOVERY MINUTES (STAT): Performed by: ORTHOPAEDIC SURGERY

## 2020-01-21 PROCEDURE — 700102 HCHG RX REV CODE 250 W/ 637 OVERRIDE(OP): Performed by: ORTHOPAEDIC SURGERY

## 2020-01-21 PROCEDURE — 700111 HCHG RX REV CODE 636 W/ 250 OVERRIDE (IP): Performed by: ORTHOPAEDIC SURGERY

## 2020-01-21 PROCEDURE — 700105 HCHG RX REV CODE 258: Performed by: ANESTHESIOLOGY

## 2020-01-21 DEVICE — PATELLA GII OVAL RESURFACING PAT 35MM (1EA): Type: IMPLANTABLE DEVICE | Site: KNEE | Status: FUNCTIONAL

## 2020-01-21 DEVICE — IMPLANT LEGION PS OXIN FEM SZ6 RT: Type: IMPLANTABLE DEVICE | Site: KNEE | Status: FUNCTIONAL

## 2020-01-21 DEVICE — IMPLANTABLE DEVICE: Type: IMPLANTABLE DEVICE | Site: KNEE | Status: FUNCTIONAL

## 2020-01-21 DEVICE — IMPLANT GNS II CMT TIB SIZE 5 RIGHT (1EA): Type: IMPLANTABLE DEVICE | Site: KNEE | Status: FUNCTIONAL

## 2020-01-21 DEVICE — CEMENT BONE SIMPLEX W/GENTAICIN (10/PK): Type: IMPLANTABLE DEVICE | Site: KNEE | Status: FUNCTIONAL

## 2020-01-21 RX ORDER — OXYCODONE HCL 5 MG/5 ML
10 SOLUTION, ORAL ORAL
Status: COMPLETED | OUTPATIENT
Start: 2020-01-21 | End: 2020-01-21

## 2020-01-21 RX ORDER — TRAMADOL HYDROCHLORIDE 50 MG/1
50 TABLET ORAL EVERY 4 HOURS PRN
Status: DISCONTINUED | OUTPATIENT
Start: 2020-01-21 | End: 2020-01-22 | Stop reason: HOSPADM

## 2020-01-21 RX ORDER — KETOROLAC TROMETHAMINE 30 MG/ML
INJECTION, SOLUTION INTRAMUSCULAR; INTRAVENOUS
Status: DISCONTINUED | OUTPATIENT
Start: 2020-01-21 | End: 2020-01-21 | Stop reason: HOSPADM

## 2020-01-21 RX ORDER — CHLORPROMAZINE HYDROCHLORIDE 25 MG/1
25 TABLET, FILM COATED ORAL EVERY 6 HOURS PRN
Status: DISCONTINUED | OUTPATIENT
Start: 2020-01-21 | End: 2020-01-22 | Stop reason: HOSPADM

## 2020-01-21 RX ORDER — ALBUTEROL SULFATE 90 UG/1
2 AEROSOL, METERED RESPIRATORY (INHALATION)
Status: DISCONTINUED | OUTPATIENT
Start: 2020-01-21 | End: 2020-01-22 | Stop reason: HOSPADM

## 2020-01-21 RX ORDER — CEFAZOLIN SODIUM 1 G/3ML
INJECTION, POWDER, FOR SOLUTION INTRAMUSCULAR; INTRAVENOUS PRN
Status: DISCONTINUED | OUTPATIENT
Start: 2020-01-21 | End: 2020-01-21 | Stop reason: SURG

## 2020-01-21 RX ORDER — ACETAMINOPHEN 500 MG
1000 TABLET ORAL EVERY 6 HOURS
Status: DISCONTINUED | OUTPATIENT
Start: 2020-01-21 | End: 2020-01-22 | Stop reason: HOSPADM

## 2020-01-21 RX ORDER — SCOLOPAMINE TRANSDERMAL SYSTEM 1 MG/1
1 PATCH, EXTENDED RELEASE TRANSDERMAL
Status: DISCONTINUED | OUTPATIENT
Start: 2020-01-21 | End: 2020-01-22 | Stop reason: HOSPADM

## 2020-01-21 RX ORDER — EPINEPHRINE 1 MG/ML(1)
AMPUL (ML) INJECTION
Status: DISCONTINUED | OUTPATIENT
Start: 2020-01-21 | End: 2020-01-21 | Stop reason: HOSPADM

## 2020-01-21 RX ORDER — ONDANSETRON 2 MG/ML
INJECTION INTRAMUSCULAR; INTRAVENOUS PRN
Status: DISCONTINUED | OUTPATIENT
Start: 2020-01-21 | End: 2020-01-21 | Stop reason: SURG

## 2020-01-21 RX ORDER — HYDROMORPHONE HYDROCHLORIDE 1 MG/ML
0.2 INJECTION, SOLUTION INTRAMUSCULAR; INTRAVENOUS; SUBCUTANEOUS
Status: DISCONTINUED | OUTPATIENT
Start: 2020-01-21 | End: 2020-01-21 | Stop reason: HOSPADM

## 2020-01-21 RX ORDER — HYDROMORPHONE HYDROCHLORIDE 1 MG/ML
0.4 INJECTION, SOLUTION INTRAMUSCULAR; INTRAVENOUS; SUBCUTANEOUS
Status: DISCONTINUED | OUTPATIENT
Start: 2020-01-21 | End: 2020-01-21 | Stop reason: HOSPADM

## 2020-01-21 RX ORDER — OXYCODONE HYDROCHLORIDE 5 MG/1
5-10 TABLET ORAL EVERY 4 HOURS PRN
Qty: 42 TAB | Refills: 0 | Status: SHIPPED | OUTPATIENT
Start: 2020-01-21 | End: 2020-01-21 | Stop reason: SDUPTHER

## 2020-01-21 RX ORDER — OXYCODONE HYDROCHLORIDE 5 MG/1
5-10 TABLET ORAL EVERY 4 HOURS PRN
Qty: 42 TAB | Refills: 0 | Status: SHIPPED | OUTPATIENT
Start: 2020-01-21 | End: 2020-01-28

## 2020-01-21 RX ORDER — MIDAZOLAM HYDROCHLORIDE 1 MG/ML
INJECTION INTRAMUSCULAR; INTRAVENOUS PRN
Status: DISCONTINUED | OUTPATIENT
Start: 2020-01-21 | End: 2020-01-21 | Stop reason: SURG

## 2020-01-21 RX ORDER — DEXAMETHASONE SODIUM PHOSPHATE 4 MG/ML
4 INJECTION, SOLUTION INTRA-ARTICULAR; INTRALESIONAL; INTRAMUSCULAR; INTRAVENOUS; SOFT TISSUE
Status: DISCONTINUED | OUTPATIENT
Start: 2020-01-21 | End: 2020-01-22 | Stop reason: HOSPADM

## 2020-01-21 RX ORDER — SODIUM CHLORIDE, SODIUM LACTATE, POTASSIUM CHLORIDE, CALCIUM CHLORIDE 600; 310; 30; 20 MG/100ML; MG/100ML; MG/100ML; MG/100ML
INJECTION, SOLUTION INTRAVENOUS CONTINUOUS
Status: DISCONTINUED | OUTPATIENT
Start: 2020-01-21 | End: 2020-01-21 | Stop reason: HOSPADM

## 2020-01-21 RX ORDER — CELECOXIB 200 MG/1
200 CAPSULE ORAL 2 TIMES DAILY
Status: DISCONTINUED | OUTPATIENT
Start: 2020-01-22 | End: 2020-01-22 | Stop reason: HOSPADM

## 2020-01-21 RX ORDER — HALOPERIDOL 5 MG/ML
1 INJECTION INTRAMUSCULAR
Status: DISCONTINUED | OUTPATIENT
Start: 2020-01-21 | End: 2020-01-21 | Stop reason: HOSPADM

## 2020-01-21 RX ORDER — HYDROMORPHONE HYDROCHLORIDE 1 MG/ML
0.5 INJECTION, SOLUTION INTRAMUSCULAR; INTRAVENOUS; SUBCUTANEOUS
Status: DISCONTINUED | OUTPATIENT
Start: 2020-01-21 | End: 2020-01-22 | Stop reason: HOSPADM

## 2020-01-21 RX ORDER — ACETAMINOPHEN 500 MG
1000 TABLET ORAL ONCE
Status: COMPLETED | OUTPATIENT
Start: 2020-01-21 | End: 2020-01-21

## 2020-01-21 RX ORDER — BISACODYL 10 MG
10 SUPPOSITORY, RECTAL RECTAL
Status: DISCONTINUED | OUTPATIENT
Start: 2020-01-21 | End: 2020-01-22 | Stop reason: HOSPADM

## 2020-01-21 RX ORDER — AMOXICILLIN 250 MG
1 CAPSULE ORAL NIGHTLY
Status: DISCONTINUED | OUTPATIENT
Start: 2020-01-21 | End: 2020-01-22 | Stop reason: HOSPADM

## 2020-01-21 RX ORDER — LIDOCAINE HYDROCHLORIDE 20 MG/ML
INJECTION, SOLUTION EPIDURAL; INFILTRATION; INTRACAUDAL; PERINEURAL PRN
Status: DISCONTINUED | OUTPATIENT
Start: 2020-01-21 | End: 2020-01-21 | Stop reason: SURG

## 2020-01-21 RX ORDER — ROPIVACAINE HYDROCHLORIDE 5 MG/ML
INJECTION, SOLUTION EPIDURAL; INFILTRATION; PERINEURAL
Status: DISCONTINUED | OUTPATIENT
Start: 2020-01-21 | End: 2020-01-21 | Stop reason: HOSPADM

## 2020-01-21 RX ORDER — BUPIVACAINE HYDROCHLORIDE 2.5 MG/ML
INJECTION, SOLUTION EPIDURAL; INFILTRATION; INTRACAUDAL PRN
Status: DISCONTINUED | OUTPATIENT
Start: 2020-01-21 | End: 2020-01-21 | Stop reason: SURG

## 2020-01-21 RX ORDER — ONDANSETRON 2 MG/ML
4 INJECTION INTRAMUSCULAR; INTRAVENOUS EVERY 4 HOURS PRN
Status: DISCONTINUED | OUTPATIENT
Start: 2020-01-21 | End: 2020-01-22 | Stop reason: HOSPADM

## 2020-01-21 RX ORDER — DIPHENHYDRAMINE HCL 25 MG
25 TABLET ORAL NIGHTLY PRN
Status: DISCONTINUED | OUTPATIENT
Start: 2020-01-22 | End: 2020-01-22 | Stop reason: HOSPADM

## 2020-01-21 RX ORDER — HYDROMORPHONE HYDROCHLORIDE 2 MG/ML
INJECTION, SOLUTION INTRAMUSCULAR; INTRAVENOUS; SUBCUTANEOUS PRN
Status: DISCONTINUED | OUTPATIENT
Start: 2020-01-21 | End: 2020-01-21 | Stop reason: HOSPADM

## 2020-01-21 RX ORDER — VANCOMYCIN HYDROCHLORIDE 1 G/20ML
INJECTION, POWDER, LYOPHILIZED, FOR SOLUTION INTRAVENOUS
Status: COMPLETED | OUTPATIENT
Start: 2020-01-21 | End: 2020-01-21

## 2020-01-21 RX ORDER — SODIUM CHLORIDE, SODIUM LACTATE, POTASSIUM CHLORIDE, CALCIUM CHLORIDE 600; 310; 30; 20 MG/100ML; MG/100ML; MG/100ML; MG/100ML
INJECTION, SOLUTION INTRAVENOUS
Status: DISCONTINUED | OUTPATIENT
Start: 2020-01-21 | End: 2020-01-21 | Stop reason: SURG

## 2020-01-21 RX ORDER — ENEMA 19; 7 G/133ML; G/133ML
1 ENEMA RECTAL
Status: DISCONTINUED | OUTPATIENT
Start: 2020-01-21 | End: 2020-01-22 | Stop reason: HOSPADM

## 2020-01-21 RX ORDER — HALOPERIDOL 5 MG/ML
1 INJECTION INTRAMUSCULAR EVERY 6 HOURS PRN
Status: DISCONTINUED | OUTPATIENT
Start: 2020-01-21 | End: 2020-01-22 | Stop reason: HOSPADM

## 2020-01-21 RX ORDER — SODIUM CHLORIDE, SODIUM LACTATE, POTASSIUM CHLORIDE, CALCIUM CHLORIDE 600; 310; 30; 20 MG/100ML; MG/100ML; MG/100ML; MG/100ML
INJECTION, SOLUTION INTRAVENOUS CONTINUOUS
Status: ACTIVE | OUTPATIENT
Start: 2020-01-21 | End: 2020-01-22

## 2020-01-21 RX ORDER — GABAPENTIN 300 MG/1
300 CAPSULE ORAL ONCE
Status: COMPLETED | OUTPATIENT
Start: 2020-01-21 | End: 2020-01-21

## 2020-01-21 RX ORDER — KETOROLAC TROMETHAMINE 30 MG/ML
30 INJECTION, SOLUTION INTRAMUSCULAR; INTRAVENOUS EVERY 6 HOURS
Status: DISCONTINUED | OUTPATIENT
Start: 2020-01-21 | End: 2020-01-22 | Stop reason: HOSPADM

## 2020-01-21 RX ORDER — DIPHENHYDRAMINE HYDROCHLORIDE 50 MG/ML
12.5 INJECTION INTRAMUSCULAR; INTRAVENOUS
Status: DISCONTINUED | OUTPATIENT
Start: 2020-01-21 | End: 2020-01-21 | Stop reason: HOSPADM

## 2020-01-21 RX ORDER — MELOXICAM 7.5 MG/1
15 TABLET ORAL DAILY
Qty: 30 TAB | Refills: 1 | Status: SHIPPED | OUTPATIENT
Start: 2020-01-21 | End: 2020-02-20

## 2020-01-21 RX ORDER — OXYCODONE HYDROCHLORIDE 10 MG/1
10 TABLET ORAL
Status: DISCONTINUED | OUTPATIENT
Start: 2020-01-21 | End: 2020-01-22 | Stop reason: HOSPADM

## 2020-01-21 RX ORDER — SODIUM CHLORIDE, SODIUM LACTATE, POTASSIUM CHLORIDE, CALCIUM CHLORIDE 600; 310; 30; 20 MG/100ML; MG/100ML; MG/100ML; MG/100ML
INJECTION, SOLUTION INTRAVENOUS CONTINUOUS
Status: DISPENSED | OUTPATIENT
Start: 2020-01-21 | End: 2020-01-21

## 2020-01-21 RX ORDER — DIPHENHYDRAMINE HYDROCHLORIDE 50 MG/ML
25 INJECTION INTRAMUSCULAR; INTRAVENOUS EVERY 6 HOURS PRN
Status: DISCONTINUED | OUTPATIENT
Start: 2020-01-21 | End: 2020-01-22 | Stop reason: HOSPADM

## 2020-01-21 RX ORDER — HYDROMORPHONE HYDROCHLORIDE 1 MG/ML
0.1 INJECTION, SOLUTION INTRAMUSCULAR; INTRAVENOUS; SUBCUTANEOUS
Status: DISCONTINUED | OUTPATIENT
Start: 2020-01-21 | End: 2020-01-21 | Stop reason: HOSPADM

## 2020-01-21 RX ORDER — DEXAMETHASONE SODIUM PHOSPHATE 4 MG/ML
10 INJECTION, SOLUTION INTRA-ARTICULAR; INTRALESIONAL; INTRAMUSCULAR; INTRAVENOUS; SOFT TISSUE ONCE
Status: COMPLETED | OUTPATIENT
Start: 2020-01-22 | End: 2020-01-22

## 2020-01-21 RX ORDER — TRAMADOL HYDROCHLORIDE 50 MG/1
50-100 TABLET ORAL EVERY 6 HOURS PRN
Qty: 80 TAB | Refills: 0 | Status: SHIPPED | OUTPATIENT
Start: 2020-01-21 | End: 2020-01-31

## 2020-01-21 RX ORDER — TRANEXAMIC ACID 100 MG/ML
INJECTION, SOLUTION INTRAVENOUS PRN
Status: DISCONTINUED | OUTPATIENT
Start: 2020-01-21 | End: 2020-01-21 | Stop reason: SURG

## 2020-01-21 RX ORDER — DIPHENHYDRAMINE HCL 25 MG
25 TABLET ORAL EVERY 6 HOURS PRN
Status: DISCONTINUED | OUTPATIENT
Start: 2020-01-21 | End: 2020-01-22 | Stop reason: HOSPADM

## 2020-01-21 RX ORDER — POLYETHYLENE GLYCOL 3350 17 G/17G
1 POWDER, FOR SOLUTION ORAL 2 TIMES DAILY PRN
Status: DISCONTINUED | OUTPATIENT
Start: 2020-01-21 | End: 2020-01-22 | Stop reason: HOSPADM

## 2020-01-21 RX ORDER — CELECOXIB 200 MG/1
400 CAPSULE ORAL ONCE
Status: COMPLETED | OUTPATIENT
Start: 2020-01-21 | End: 2020-01-21

## 2020-01-21 RX ORDER — DOCUSATE SODIUM 100 MG/1
100 CAPSULE, LIQUID FILLED ORAL 2 TIMES DAILY
Status: DISCONTINUED | OUTPATIENT
Start: 2020-01-21 | End: 2020-01-22 | Stop reason: HOSPADM

## 2020-01-21 RX ORDER — OXYCODONE HCL 5 MG/5 ML
5 SOLUTION, ORAL ORAL
Status: COMPLETED | OUTPATIENT
Start: 2020-01-21 | End: 2020-01-21

## 2020-01-21 RX ORDER — ONDANSETRON 2 MG/ML
4 INJECTION INTRAMUSCULAR; INTRAVENOUS
Status: DISCONTINUED | OUTPATIENT
Start: 2020-01-21 | End: 2020-01-21 | Stop reason: HOSPADM

## 2020-01-21 RX ORDER — OXYCODONE HYDROCHLORIDE 5 MG/1
5 TABLET ORAL
Status: DISCONTINUED | OUTPATIENT
Start: 2020-01-21 | End: 2020-01-22 | Stop reason: HOSPADM

## 2020-01-21 RX ORDER — CHLORPROMAZINE HYDROCHLORIDE 25 MG/ML
25 INJECTION INTRAMUSCULAR EVERY 6 HOURS PRN
Status: DISCONTINUED | OUTPATIENT
Start: 2020-01-21 | End: 2020-01-22 | Stop reason: HOSPADM

## 2020-01-21 RX ORDER — DEXAMETHASONE SODIUM PHOSPHATE 4 MG/ML
INJECTION, SOLUTION INTRA-ARTICULAR; INTRALESIONAL; INTRAMUSCULAR; INTRAVENOUS; SOFT TISSUE PRN
Status: DISCONTINUED | OUTPATIENT
Start: 2020-01-21 | End: 2020-01-21 | Stop reason: SURG

## 2020-01-21 RX ORDER — AMOXICILLIN 250 MG
1 CAPSULE ORAL
Status: DISCONTINUED | OUTPATIENT
Start: 2020-01-21 | End: 2020-01-22 | Stop reason: HOSPADM

## 2020-01-21 RX ADMIN — FENTANYL CITRATE 50 MCG: 50 INJECTION, SOLUTION INTRAMUSCULAR; INTRAVENOUS at 14:11

## 2020-01-21 RX ADMIN — HYDROMORPHONE HYDROCHLORIDE 0.4 MG: 2 INJECTION, SOLUTION INTRAMUSCULAR; INTRAVENOUS; SUBCUTANEOUS at 14:46

## 2020-01-21 RX ADMIN — TRANEXAMIC ACID 2000 MG: 100 INJECTION, SOLUTION INTRAVENOUS at 16:20

## 2020-01-21 RX ADMIN — PROPOFOL 200 MG: 10 INJECTION, EMULSION INTRAVENOUS at 14:11

## 2020-01-21 RX ADMIN — BUPIVACAINE HYDROCHLORIDE 30 ML: 2.5 INJECTION, SOLUTION EPIDURAL; INFILTRATION; INTRACAUDAL; PERINEURAL at 13:42

## 2020-01-21 RX ADMIN — HYDROMORPHONE HYDROCHLORIDE 0.6 MG: 2 INJECTION, SOLUTION INTRAMUSCULAR; INTRAVENOUS; SUBCUTANEOUS at 14:25

## 2020-01-21 RX ADMIN — TRANEXAMIC ACID 1000 MG: 100 INJECTION, SOLUTION INTRAVENOUS at 14:16

## 2020-01-21 RX ADMIN — SODIUM CHLORIDE, POTASSIUM CHLORIDE, SODIUM LACTATE AND CALCIUM CHLORIDE: 600; 310; 30; 20 INJECTION, SOLUTION INTRAVENOUS at 12:33

## 2020-01-21 RX ADMIN — SENNOSIDES AND DOCUSATE SODIUM 1 TABLET: 8.6; 5 TABLET ORAL at 20:09

## 2020-01-21 RX ADMIN — SODIUM CHLORIDE, POTASSIUM CHLORIDE, SODIUM LACTATE AND CALCIUM CHLORIDE: 600; 310; 30; 20 INJECTION, SOLUTION INTRAVENOUS at 17:37

## 2020-01-21 RX ADMIN — OXYCODONE HYDROCHLORIDE 5 MG: 5 SOLUTION ORAL at 16:23

## 2020-01-21 RX ADMIN — DOCUSATE SODIUM 100 MG: 100 CAPSULE, LIQUID FILLED ORAL at 17:37

## 2020-01-21 RX ADMIN — ACETAMINOPHEN 1000 MG: 500 TABLET, FILM COATED ORAL at 17:37

## 2020-01-21 RX ADMIN — CEFAZOLIN 2 G: 1 INJECTION, POWDER, FOR SOLUTION INTRAVENOUS at 14:15

## 2020-01-21 RX ADMIN — ACETAMINOPHEN 1000 MG: 500 TABLET, FILM COATED ORAL at 23:26

## 2020-01-21 RX ADMIN — KETOROLAC TROMETHAMINE 30 MG: 30 INJECTION, SOLUTION INTRAMUSCULAR at 23:26

## 2020-01-21 RX ADMIN — HYDROMORPHONE HYDROCHLORIDE 0.4 MG: 2 INJECTION, SOLUTION INTRAMUSCULAR; INTRAVENOUS; SUBCUTANEOUS at 14:37

## 2020-01-21 RX ADMIN — ACETAMINOPHEN 1000 MG: 500 TABLET, FILM COATED ORAL at 12:35

## 2020-01-21 RX ADMIN — MIDAZOLAM HYDROCHLORIDE 2 MG: 1 INJECTION, SOLUTION INTRAMUSCULAR; INTRAVENOUS at 13:40

## 2020-01-21 RX ADMIN — FENTANYL CITRATE 25 MCG: 50 INJECTION INTRAMUSCULAR; INTRAVENOUS at 16:23

## 2020-01-21 RX ADMIN — CELECOXIB 400 MG: 200 CAPSULE ORAL at 12:35

## 2020-01-21 RX ADMIN — DEXAMETHASONE SODIUM PHOSPHATE 8 MG: 4 INJECTION, SOLUTION INTRAMUSCULAR; INTRAVENOUS at 14:17

## 2020-01-21 RX ADMIN — KETOROLAC TROMETHAMINE 30 MG: 30 INJECTION, SOLUTION INTRAMUSCULAR at 17:37

## 2020-01-21 RX ADMIN — SODIUM CHLORIDE 2 G: 9 INJECTION, SOLUTION INTRAVENOUS at 21:16

## 2020-01-21 RX ADMIN — OXYCODONE HYDROCHLORIDE 5 MG: 5 TABLET ORAL at 22:17

## 2020-01-21 RX ADMIN — GABAPENTIN 300 MG: 300 CAPSULE ORAL at 12:35

## 2020-01-21 RX ADMIN — SODIUM CHLORIDE, POTASSIUM CHLORIDE, SODIUM LACTATE AND CALCIUM CHLORIDE: 600; 310; 30; 20 INJECTION, SOLUTION INTRAVENOUS at 14:07

## 2020-01-21 RX ADMIN — ONDANSETRON 4 MG: 2 INJECTION INTRAMUSCULAR; INTRAVENOUS at 14:26

## 2020-01-21 RX ADMIN — FENTANYL CITRATE 50 MCG: 50 INJECTION, SOLUTION INTRAMUSCULAR; INTRAVENOUS at 13:40

## 2020-01-21 RX ADMIN — HYDROMORPHONE HYDROCHLORIDE 0.2 MG: 2 INJECTION, SOLUTION INTRAMUSCULAR; INTRAVENOUS; SUBCUTANEOUS at 15:01

## 2020-01-21 RX ADMIN — HYDROMORPHONE HYDROCHLORIDE 0.4 MG: 2 INJECTION, SOLUTION INTRAMUSCULAR; INTRAVENOUS; SUBCUTANEOUS at 14:52

## 2020-01-21 RX ADMIN — LIDOCAINE HYDROCHLORIDE 100 MG: 20 INJECTION, SOLUTION EPIDURAL; INFILTRATION; INTRACAUDAL; PERINEURAL at 14:11

## 2020-01-21 ASSESSMENT — PAIN SCALES - GENERAL: PAIN_LEVEL: 3

## 2020-01-21 ASSESSMENT — LIFESTYLE VARIABLES
AVERAGE NUMBER OF DAYS PER WEEK YOU HAVE A DRINK CONTAINING ALCOHOL: 0
EVER FELT BAD OR GUILTY ABOUT YOUR DRINKING: NO
TOTAL SCORE: 0
ALCOHOL_USE: NO
EVER HAD A DRINK FIRST THING IN THE MORNING TO STEADY YOUR NERVES TO GET RID OF A HANGOVER: NO
HOW MANY TIMES IN THE PAST YEAR HAVE YOU HAD 5 OR MORE DRINKS IN A DAY: 0
TOTAL SCORE: 0
TOTAL SCORE: 0
HAVE PEOPLE ANNOYED YOU BY CRITICIZING YOUR DRINKING: NO
CONSUMPTION TOTAL: NEGATIVE
HAVE YOU EVER FELT YOU SHOULD CUT DOWN ON YOUR DRINKING: NO
ON A TYPICAL DAY WHEN YOU DRINK ALCOHOL HOW MANY DRINKS DO YOU HAVE: 0

## 2020-01-21 ASSESSMENT — COGNITIVE AND FUNCTIONAL STATUS - GENERAL
SUGGESTED CMS G CODE MODIFIER MOBILITY: CH
DAILY ACTIVITIY SCORE: 24
SUGGESTED CMS G CODE MODIFIER DAILY ACTIVITY: CH
MOBILITY SCORE: 24

## 2020-01-21 ASSESSMENT — PATIENT HEALTH QUESTIONNAIRE - PHQ9
2. FEELING DOWN, DEPRESSED, IRRITABLE, OR HOPELESS: NOT AT ALL
1. LITTLE INTEREST OR PLEASURE IN DOING THINGS: NOT AT ALL
SUM OF ALL RESPONSES TO PHQ9 QUESTIONS 1 AND 2: 0

## 2020-01-21 NOTE — ANESTHESIA TIME REPORT
Anesthesia Start and Stop Event Times     Date Time Event    1/21/2020 1347 Ready for Procedure     1407 Anesthesia Start     1557 Anesthesia Stop        Responsible Staff  01/21/20    Name Role Begin End    Ed Key M.D. Anesth 1407 1557        Preop Diagnosis (Free Text):  Pre-op Diagnosis     OA RIGHT KNEE WITH PAIN        Preop Diagnosis (Codes):    Post op Diagnosis  Osteoarthritis of right knee      Premium Reason  A. 3PM - 7AM    Comments:

## 2020-01-21 NOTE — ANESTHESIA QCDR
2019 [facility] Clinical Data Registry (for Quality Improvement)     Postoperative nausea/vomiting risk protocol (Adult = 18 yrs and Pediatric 3-17 yrs)- (430 and 463)  General inhalation anesthetic (NOT TIVA) with PONV risk factors: No  Provision of anti-emetic therapy with at least 2 different classes of agents: N/A  Patient DID NOT receive anti-emetic therapy and reason is documented in Medical Record: N/A    Multimodal Pain Management- (477)  Non-emergent surgery AND patient age >= 18: Yes  Use of Multimodal Pain Management, two or more drugs and/or interventions, NOT including systemic opioids: Yes  Exception: Documented allergy to multiple classes of analgesics: N/A    Smoking Abstinence (404)  Patient is current smoker (cigarette, pipe, e-cig, marijuanna): No  Elective Surgery:   Abstinence instructions provided prior to day of surgery:   Patient abstained from smoking on day of surgery:     Pre-Op Beta-Blocker in Isolated CABG (44)  Isolated CABG AND patient age >= 18: No  Beta-blocker admin within 24 hours of surgical incision:   Exception:of medical reason(s) for not administering beta blocker within 24 hours prior to surgical incision (e.g., not  indicated,other medical reason):     PACU assessment of acute postoperative pain prior to Anesthesia Care End- Applies to Patients Age = 18- (ABG7)  Initial PACU pain score is which of the following: < 7/10  Patient unable to report pain score: N/A    Post-anesthetic transfer of care checklist/protocol to PACU/ICU- (426 and 427)  Upon conclusion of case, patient transferred to which of the following locations: PACU/Non-ICU  Use of transfer checklist/protocol: Yes  Exclusion: Service Performed in Patient Hospital Room (and thus did not require transfer): N/A  Unplanned admission to ICU related to anesthesia service up through end of PACU care- (MD51)  Unplanned admission to ICU (not initially anticipated at anesthesia start time): No

## 2020-01-21 NOTE — ANESTHESIA PROCEDURE NOTES
Peripheral Block  Date/Time: 1/21/2020 1:40 PM  Performed by: Ed Key M.D.  Authorized by: Ed Key M.D.     Patient Location:  Pre-op  Start Time:  1/21/2020 1:40 PM  End Time:  1/21/2020 1:43 PM  Reason for Block: at surgeon's request and post-op pain management    patient identified, IV checked, site marked, risks and benefits discussed, surgical consent, monitors and equipment checked, pre-op evaluation and timeout performed    Patient Position:  Supine  Prep: ChloraPrep    Monitoring:  Heart rate, continuous pulse ox and cardiac monitor  Block Region:  Lower Extremity  Lower Extremity - Block Type:  Selective FEMORAL nerve block at the Adductor Canal    Laterality:  Right  Procedures: ultrasound guided  Image captured, interpreted and electronically stored.  Local Infiltration:  Lidocaine  Strength:  1 %  Dose:  3 ml  Block Type:  Single-shot  Needle Localization:  Ultrasound guidance  Injection Assessment:  Negative aspiration for heme, no paresthesia on injection, incremental injection and local visualized surrounding nerve on ultrasound  Evidence of intravascular injection: No     US Guided Selective Femoral Nerve Block at Adductor Canal:   US probe placed at mid-thigh level on externally rotated leg and femur identified.  Probe directed medially until Sartorius Muscle (SM), Femoral Artery (FA) and Saphenous Nerve (SN) identified in Adductor Canal (AC).  Needle inserted anterolateral to probe in an in plane approach into a subsartorial perivascular perineural position.  After negative aspiration LA injected with ease and visualized spreading within the AC. Image in chart.

## 2020-01-21 NOTE — OP REPORT
Preop Diagnosis: Advanced right knee arthritis, Prior knee dislocation and multilig reconstruction  Postop Diagnosis:  Same   Procedure: Right total knee arthroplasty  Surgeon: Dr. Dominic Villavicencio MD  Assistant: José Manuel Wolf MD  Anesthesia: Dr. Key.  General + Adductor canal  Estimated Blood Loss: 50cc  Drains: None  Complications: None    Implants: [employer] PS, size 6 femur, size 5 tibia, with an 11 mm insert and 35 oval patella.     Indications: He is a pleasant 34-year-old male who a few years ago sustained a right knee dislocation after being thrown from a horse.  He underwent multiple surgeries for multi-ligamentous repair, but is gone on to develop significant arthritis and pain and disability on a daily basis.  He wears a brace, takes anti-inflammatories, has had multiple injections, and has done physical therapy, but those things are just no longer providing any sort of reasonable quality of life.  We discussed the options and he was indicated for knee replacement.  We discussed the risk of bleeding, transfusion, pain, neurovascular injury, stiffness, fracture, infection, wound complication, loosening of the parts over time, blood clots, and medical complication.  He elected to proceed.  We discussed that these risks are particularly elevated in him given his young age and history of multiple surgeries and stiffness.    Pertinent Findings: He had severe degenerative change.  He was bone-on-bone in the lateral compartment, but the cartilage throughout the rest of the knee was also tending to delaminate.  His knee was very scarred in his suprapatellar pouch was completely full of scar, which made his exposure quite difficult.  Despite his previous ligamentous injury, the knee was relatively stable and no significant releases were necessary to achieve equal balance.  A lateral release was necessary in order for his patella tracks centrally.  We double checked and were happy with the rotation of  the components, but his patella was certainly scarred down pretty significantly on the lateral side, and with the lateral release the patella tracked much better.  At the end of the case his knee easily came to full extension and flexed up to 140 degrees with the arthrotomy closed with appropriate stability and patellar tracking.      Procedure:  He was identified in the preoperative holding area and informed consent and site marking were confirmed.  An adductor canal block had been performed by Dr. Key.  He was then brought back to the OR where anesthesia was performed.  He was positioned supine with all bony prominences well padded with a padded tourniquet on the thigh.  The extremity was prepped and draped in the usual sterile fashion. I performed a pre-procedure timeout to confirm we had the correct patient, side, site, procedure, and presence of necessary personal and equipment.  I confirmed that antibiotics had been administered including Ancef.  The tourniquet was then inflated.    His previous incision was excised.and taken down to the deep capsule of the knee. A medial parapatellar arthrotomy was performed.  The fat pad was released.  A synovectomy was performed.  The gutters were released.  The patella was retracted. The remnants of the anterior horn of the medial and lateral meniscus were removed as well as the ACL and PCL from the intercondylar notch. All distal protruding osteophytes were removed.  We had custom cutting blocks made for him, but given his significant scarring, difficult exposure, and abnormal anatomy, the blocks just did not fit with any degree of certainty, and were not utilized.  I  drilled and accessed the intramedullary canal of the femur, lavaged it and placed the intramedullary cutting guide. The distal femur was cut in 5 degrees of valgus. The cut was verified and attention was then turned to the tibia. The tibia was subluxed forward. It was cut using external medullary  alignment perpendicular to the mechanical axis. The knee was then brought into full extension and the extension gap was assessed. Sequential releases were performed in extension to form a rectangular space, confirmed using a spacer block. Overall limb alignment was appropriate.  The knee was then flexed to 90 degrees.  The flexion space was tensed at 90 degrees with a tensiometer to set external rotation, a flexion gap equal to the extension gap, and to measure the AP size.  The cutting block was then pinned in place.  I checked to ensure that rotation looked appropriate based on Pee's line and transepicondylar line.  An luther wing was used to make sure I wouldn't notch anteriorly.  Anterior, posterior, chamfer, and box cuts for the femur were then made. The knee was then again tensed at 90 degrees and the meniscal remnants and posterior osteophytes were removed.  The posterior capsule was injected with a local anesthetic cocktail.  The tibia was then subluxed forward, sized, and punched in the appropriate amount of external rotation and mechanical alignment was verified using a drop parisa. Trials were placed, the knee was reduced with a trial insert, it was taken through a range of motion, and found to be stable in the varus/valgus plane 0-30 degrees of flexion and the AP plane at 90 degrees of flexion. Attention was then turned to the patella. A symmetric resection was performed to recreate native patella height and appropriately sized. All extraneous osteophyte and synovium were removed.  With a trial in place, the patella tracked centrally using the no thumbs technique. All trial components were removed. The real components were opened on the back table. The bone ends were copiously lavaged and dried. Two packs of cement were mixed and the real components were cemented into place. The knee was reduced with a trial insert in place and the cement was allowed to cure.     Once the cement cured, the tourniquet  was released and meticulous hemostasis was obtained. All extraneous cement was removed from around the knee taking particular care to remove extraneous cement from the posterior aspect of the knee. The real insert was placed. Stability and patellar tracking were excellent. The knee was then copiously irrigated. The arthrotomy was closed with number 2 running barbed suture, and the wound was closed in layers.  An incisional vac dressing was applied and the patient was turned over to anesthesia in stable condition without any apparent intraoperative complications.     Plan:  WBAT, PT/OT evaluation, Aspirin 81mg BID for 4 weeks for DVT prophylaxis, Leave dressing in place until followup.

## 2020-01-21 NOTE — ANESTHESIA PROCEDURE NOTES
Airway  Date/Time: 1/21/2020 2:12 PM  Performed by: Ed Key M.D.  Authorized by: Ed Key M.D.     Location:  OR  Urgency:  Elective  Indications for Airway Management:  Anesthesia  Spontaneous Ventilation: absent    Sedation Level:  Deep  Preoxygenated: Yes    Final Airway Type:  Supraglottic airway  Final Supraglottic Airway:  Standard LMA  SGA Size:  5  Number of Attempts at Approach:  1

## 2020-01-22 VITALS
TEMPERATURE: 98.8 F | SYSTOLIC BLOOD PRESSURE: 128 MMHG | HEIGHT: 72 IN | OXYGEN SATURATION: 96 % | BODY MASS INDEX: 31.65 KG/M2 | WEIGHT: 233.69 LBS | RESPIRATION RATE: 18 BRPM | HEART RATE: 110 BPM | DIASTOLIC BLOOD PRESSURE: 76 MMHG

## 2020-01-22 LAB
ANION GAP SERPL CALC-SCNC: 15 MMOL/L (ref 0–11.9)
BUN SERPL-MCNC: 16 MG/DL (ref 8–22)
CALCIUM SERPL-MCNC: 9 MG/DL (ref 8.4–10.2)
CHLORIDE SERPL-SCNC: 103 MMOL/L (ref 96–112)
CO2 SERPL-SCNC: 19 MMOL/L (ref 20–33)
CREAT SERPL-MCNC: 0.93 MG/DL (ref 0.5–1.4)
GLUCOSE SERPL-MCNC: 161 MG/DL (ref 65–99)
HCT VFR BLD AUTO: 39.7 % (ref 42–52)
HGB BLD-MCNC: 14 G/DL (ref 14–18)
POTASSIUM SERPL-SCNC: 4.4 MMOL/L (ref 3.6–5.5)
SODIUM SERPL-SCNC: 137 MMOL/L (ref 135–145)

## 2020-01-22 PROCEDURE — G0378 HOSPITAL OBSERVATION PER HR: HCPCS

## 2020-01-22 PROCEDURE — 97161 PT EVAL LOW COMPLEX 20 MIN: CPT

## 2020-01-22 PROCEDURE — 97165 OT EVAL LOW COMPLEX 30 MIN: CPT

## 2020-01-22 PROCEDURE — 96376 TX/PRO/DX INJ SAME DRUG ADON: CPT

## 2020-01-22 PROCEDURE — 96366 THER/PROPH/DIAG IV INF ADDON: CPT

## 2020-01-22 PROCEDURE — A9270 NON-COVERED ITEM OR SERVICE: HCPCS | Performed by: ORTHOPAEDIC SURGERY

## 2020-01-22 PROCEDURE — 700105 HCHG RX REV CODE 258: Performed by: ORTHOPAEDIC SURGERY

## 2020-01-22 PROCEDURE — 700111 HCHG RX REV CODE 636 W/ 250 OVERRIDE (IP): Performed by: ORTHOPAEDIC SURGERY

## 2020-01-22 PROCEDURE — 80048 BASIC METABOLIC PNL TOTAL CA: CPT

## 2020-01-22 PROCEDURE — 85018 HEMOGLOBIN: CPT

## 2020-01-22 PROCEDURE — 85014 HEMATOCRIT: CPT

## 2020-01-22 PROCEDURE — 36415 COLL VENOUS BLD VENIPUNCTURE: CPT

## 2020-01-22 PROCEDURE — 700102 HCHG RX REV CODE 250 W/ 637 OVERRIDE(OP): Performed by: ORTHOPAEDIC SURGERY

## 2020-01-22 RX ADMIN — KETOROLAC TROMETHAMINE 30 MG: 30 INJECTION, SOLUTION INTRAMUSCULAR at 05:05

## 2020-01-22 RX ADMIN — ACETAMINOPHEN 1000 MG: 500 TABLET, FILM COATED ORAL at 05:05

## 2020-01-22 RX ADMIN — SODIUM CHLORIDE 2 G: 9 INJECTION, SOLUTION INTRAVENOUS at 05:04

## 2020-01-22 RX ADMIN — ASPIRIN 81 MG: 81 TABLET, COATED ORAL at 05:00

## 2020-01-22 RX ADMIN — OXYCODONE HYDROCHLORIDE 10 MG: 10 TABLET ORAL at 08:46

## 2020-01-22 RX ADMIN — DEXAMETHASONE SODIUM PHOSPHATE 10 MG: 4 INJECTION, SOLUTION INTRA-ARTICULAR; INTRALESIONAL; INTRAMUSCULAR; INTRAVENOUS; SOFT TISSUE at 05:05

## 2020-01-22 ASSESSMENT — GAIT ASSESSMENTS
DISTANCE (FEET): 200
GAIT LEVEL OF ASSIST: SUPERVISED
DEVIATION: ANTALGIC;STEP TO
ASSISTIVE DEVICE: CRUTCHES

## 2020-01-22 ASSESSMENT — COGNITIVE AND FUNCTIONAL STATUS - GENERAL
WALKING IN HOSPITAL ROOM: A LITTLE
SUGGESTED CMS G CODE MODIFIER MOBILITY: CJ
CLIMB 3 TO 5 STEPS WITH RAILING: A LITTLE
MOBILITY SCORE: 22

## 2020-01-22 ASSESSMENT — ACTIVITIES OF DAILY LIVING (ADL): TOILETING: INDEPENDENT

## 2020-01-22 NOTE — DISCHARGE INSTRUCTIONS
Patient will be discharged home and follow up with Dr. Villavicencio in 2 weeks, for which the patient already has scheduled    [facility] office phone number is [phone].      Patient to begin Physical Therapy as currently scheduled.      Encourage ROM of the knee.      Leave the bandage in place until your followup appointment in 2 weeks.  -Call if there is drainage beneath the bandage  -Use ice and elevation frequently to help with pain and swelling control  -Put as much weight as comfortable on the operative leg.  Use a walker to assist with balance.  -Take your blood clot prevention medication for 4 weeks after surgery    Discharge Instructions    Discharged to home by car with relative. Discharged via wheelchair, hospital escort: Yes.  Special equipment needed: Walker    Be sure to schedule a follow-up appointment with your primary care doctor or any specialists as instructed.     Discharge Plan:   Influenza Vaccine Indication: Patient Refuses    I understand that a diet low in cholesterol, fat, and sodium is recommended for good health. Unless I have been given specific instructions below for another diet, I accept this instruction as my diet prescription.   Other diet: Regular as tolerated    Special Instructions: Discharge instructions for the Orthopedic Patient    Follow up with Primary Care Physician within 2 weeks of discharge to home, regarding:  Review of medications and diagnostic testing.  Surveillance for medical complications.  Workup and treatment of osteoporosis, if appropriate.     -Is this a Joint Replacement patient? Yes Total Joint Knee Replacement Discharge Instructions    Pain  - The goal is to slowly wean off the prescription pain medicine.  - Ice can be used for pain control.  20 minutes at a time is recommended, and never directly against your skin or incision.  - Most patients are off the pain pills by 3 weeks; others may require a low level of pain medications for many months.  If your pain  continues to be severe, follow up with your physician.  Infection    Knee joint infections; occur in fewer than 2% of patients. The most common causes of infection following total knee replacement surgery are from bacteria that enter the bloodstream during dental procedures, urinary tract infections, or skin infections. These bacteria can lodge around your knee replacement and cause an infection.  - Keep the incision as clean and dry as possible.  - Always wash your hands before touching your incision.  - Skin infections tend to develop around 7-10 days after surgery; most can be treated with oral antibiotics.  - Dental Care should be delayed for 3 months after surgery, your surgeon recommends taking a dose of antibiotics 1 hour prior to any dental procedure. After 2 years, most surgeons recommend antibiotics only before an extensive procedure.  Ask your surgeon what he recommends.  - Signs and symptoms of infection can include:  low grade fever, redness, pain, swelling and drainage from your incision.  Notify your surgeon immediately if you develop any of these symptoms.  Other instructions  - Bowel habits - constipation is extremely common and is caused by a combination of anesthesia, lack of mobility and pain medicine.  Use stool softeners or laxatives if necessary. It is important not to ignore this problem, as bowel obstructions can be a serious complication after joint replacement surgery.  - Mood/Energy Level - Many patients experience a lack of energy and endurance for up to 2-3 months after surgery.  Some may also feel down and can even become depressed.  This is likely due to the postoperative anemia, change in activity level, lack of sleep, pain medicine and just the emotional reaction to the surgery itself that is a big disruption in a person’s life.  This usually passes.  If symptoms persist, follow up with your primary physician.  - Returning to work - Your surgeon will give you more specific  instructions. Depending on the type of activities you perform, it may be 6 to 8 weeks before you return to work.   Generally, if you work a sedentary job requiring little standing or walking, most patients may return within 2-6 weeks.  Manual labor jobs involving walking, lifting and standing may take longer. Your surgeon’s office can provide a release to part-time or light duty work early on in your recovery and progress you to full duty as able.    - Driving - If your left knee was replaced and you have an automatic transmission, you may be able to begin driving in a week or so, provided you are no longer taking narcotic pain medication. If your right knee was replaced, avoid driving for 6 to 8 weeks. Remember that your reflexes may not be as sharp as before your surgery. Ask your surgeon for specific instructions.   - Avoiding falls - A fall during the first few weeks after surgery can damage your new knee and may result in a need for further surgery.   throw rugs and tack down loose carpeting.  Be aware of floor hazards such as pets, small objects or uneven surfaces.    - Airport Metal Detectors - The sensitivity of metal detectors varies and it is likely that your prosthesis will cause an alarm.  Inform the  of your artificial joint.  Diet  - Resume your normal diet as tolerated.  - It is important to achieve a healthy nutritional status by eating a well balanced diet on a regular basis.  - Your physician may recommend that you take iron and vitamin supplements.   - Continue to drink plenty of fluids.  Shower/Bathing  - You may shower as soon as you get home from the hospital unless otherwise instructed.  - Keep your incision out of water.  To keep the incision dry when showering, cover it with a plastic bag or plastic wrap.  - Pat incision dry if it gets wet.  Don’t rub.  - Do not submerge in a bath until staples are out and the incision is completely healed. (Approximately 6-8  weeks)  Dressing Change:  Procedure (if recommended by your physician)  - Wash hands.  - Open all new dressing change materials.  - Remove old dressing and discard.  - Inspect incision for redness, increase in clear drainage, yellow/green drainage, odor and surrounding skin hot to touch.  -  ABD (large gauze) pad or “island dressing” by one corner and lay over the incision.  Be careful not to touch the inside of the dressing that will lay over the incision.  - Secure in place as instructed (Ace wrap or tape).    Swelling/Bruising    - Swelling can last from 3-6 months.  - Elevate your leg higher than your heart while reclining.   The first week you are home you should elevate your leg an equal amount of time, as you are active.    - Anti-inflammatory pills can be taken once you have stopped the blood thinners.  - The swelling is usually worse after you go home since you are upright for longer periods of time.  - Bruising is common and can involve the entire leg including the thigh, calf and even foot. Bruising often does not appear until after you arrive home and it can be quite dramatic- purple, black, and green.  The bruising you can see is not usually concerning and will subside without any treatment.      Blood Clot Prevention  Blood clots in the legs and the less common, but frightening, clots that travel to the lungs are a real focus of our preventative. Most patients are at standard risk for them, but those patients who are at higher risk include people who have had previous clots, a family history of clotting, smoking, diabetes, obesity, advanced age, use of estrogen and a sedentary lifestyle.    - Signs of blood clots in legs - Swelling in thigh, calf or ankle that does not go down with elevation.  Pain, heat and tenderness in calf, back of calf or groin area.  NOTE: blood clots can occur in either leg.  - You have been receiving anticoagulant therapy (blood thinners) in the hospital and you may be  instructed to continue at home depending on your risk factors.  - Your risk for developing a clot continues for up to 2-3 months after surgery.  You should avoid prolonged sitting and dehydration during that time (long air trips and car trips).  If you do take a trip during this time, please get up and move around every 1- 1.5 hours.  - If you are prescribed blood-thinning medication for home, follow instructions as directed. (Handouts provided if applicable).      Activity  Once home, you should continue to stay active. The key is to remember not to overdo it! While you can expect some good days and some bad days, you should notice a gradual improvement and a gradual increase in your endurance over the next 6 to 12 months. Exercise is a critical component of home care, particularly during the first few weeks after surgery.     - Normal activities of daily living You should be able to resume most within 3 to 6 weeks following surgery. Some pain with activity and at night is common for several weeks after surgery  -  Physical Therapy Exercises - Continue to do the exercises prescribed for at least two months after surgery. Riding a stationary bicycle can help maintain muscle tone and keep your knee flexible. Try to achieve the maximum degree of bending and extension possible. (handout provided by Therapist).  - Sexual Activity -. Your surgeon can tell you when it safe to resume sexual activity.    - Sleeping Positions - You can safely sleep on your back, on either side, or on your stomach.   - Other Activities - Walk as much as you like, but remember that walking is no substitute for the exercises your doctor and physical therapist will prescribe. Lower impact activities are preferred.  If you have specific questions, consult your Surgeon.    When to Call the Doctor   Call the physician if:   - Fever over 100.5? F  - Increased pain, drainage, redness, odor or heat around the incision area  - Shaking  chills  - Increased knee pain with activity and rest  - Increased pain in calf, tenderness or redness above or below the knee  - Increased swelling of calf, ankle, foot  - Sudden increased shortness of breath, sudden onset of chest pain, localized chest pain with coughing  - Incision opening  Or, if there are any questions or concerns about medications or care.       -Is this patient being discharged with medication to prevent blood clots?  Yes, Aspirin Aspirin, ASA oral tablets  What is this medicine?  ASPIRIN (AS pir in) is a pain reliever. It is used to treat mild pain and fever. This medicine is also used as directed by a doctor to prevent and to treat heart attacks, to prevent strokes, and to treat arthritis or inflammation.  This medicine may be used for other purposes; ask your health care provider or pharmacist if you have questions.  COMMON BRAND NAME(S): Aspir-Low, Aspir-Tere, Aspirtab, Brittany Advanced Aspirin, Brittany Aspirin, Brittany Aspirin Extra Strength, Brittany Aspirin Plus, Brittany Extra Strength, Brittany Extra Strength Plus, Brittany Genuine Aspirin, Brittany Womens Aspirin, Bufferin, Bufferin Extra Strength, Bufferin Low Dose  What should I tell my health care provider before I take this medicine?  They need to know if you have any of these conditions:  -anemia  -asthma  -bleeding problems  -child with chickenpox, the flu, or other viral infection  -diabetes  -gout  -if you frequently drink alcohol containing drinks  -kidney disease  -liver disease  -low level of vitamin K  -lupus  -smoke tobacco  -stomach ulcers or other problems  -an unusual or allergic reaction to aspirin, tartrazine dye, other medicines, dyes, or preservatives  -pregnant or trying to get pregnant  -breast-feeding  How should I use this medicine?  Take this medicine by mouth with a glass of water. Follow the directions on the package or prescription label. You can take this medicine with or without food. If it upsets your stomach, take it with  food. Do not take your medicine more often than directed.  Talk to your pediatrician regarding the use of this medicine in children. While this drug may be prescribed for children as young as 12 years of age for selected conditions, precautions do apply. Children and teenagers should not use this medicine to treat chicken pox or flu symptoms unless directed by a doctor.  Patients over 65 years old may have a stronger reaction and need a smaller dose.  Overdosage: If you think you have taken too much of this medicine contact a poison control center or emergency room at once.  NOTE: This medicine is only for you. Do not share this medicine with others.  What if I miss a dose?  If you are taking this medicine on a regular schedule and miss a dose, take it as soon as you can. If it is almost time for your next dose, take only that dose. Do not take double or extra doses.  What may interact with this medicine?  Do not take this medicine with any of the following medications:  -cidofovir  -ketorolac  -probenecid  This medicine may also interact with the following medications:  -alcohol  -alendronate  -bismuth subsalicylate  -flavocoxid  -herbal supplements like feverfew, garlic, padmini, ginkgo biloba, horse chestnut  -medicines for diabetes or glaucoma like acetazolamide, methazolamide  -medicines for gout  -medicines that treat or prevent blood clots like enoxaparin, heparin, ticlopidine, warfarin  -other aspirin and aspirin-like medicines  -NSAIDs, medicines for pain and inflammation, like ibuprofen or naproxen  -pemetrexed  -sulfinpyrazone  -varicella live vaccine  This list may not describe all possible interactions. Give your health care provider a list of all the medicines, herbs, non-prescription drugs, or dietary supplements you use. Also tell them if you smoke, drink alcohol, or use illegal drugs. Some items may interact with your medicine.  What should I watch for while using this medicine?  If you are treating  yourself for pain, tell your doctor or health care professional if the pain lasts more than 10 days, if it gets worse, or if there is a new or different kind of pain. Tell your doctor if you see redness or swelling. Also, check with your doctor if you have a fever that lasts for more than 3 days. Only take this medicine to prevent heart attacks or blood clotting if prescribed by your doctor or health care professional.  Do not take aspirin or aspirin-like medicines with this medicine. Too much aspirin can be dangerous. Always read the labels carefully.  This medicine can irritate your stomach or cause bleeding problems. Do not smoke cigarettes or drink alcohol while taking this medicine. Do not lie down for 30 minutes after taking this medicine to prevent irritation to your throat.  If you are scheduled for any medical or dental procedure, tell your healthcare provider that you are taking this medicine. You may need to stop taking this medicine before the procedure.  This medicine may be used to treat migraines. If you take migraine medicines for 10 or more days a month, your migraines may get worse. Keep a diary of headache days and medicine use. Contact your healthcare professional if your migraine attacks occur more frequently.  What side effects may I notice from receiving this medicine?  Side effects that you should report to your doctor or health care professional as soon as possible:  -allergic reactions like skin rash, itching or hives, swelling of the face, lips, or tongue  -breathing problems  -changes in hearing, ringing in the ears  -confusion  -general ill feeling or flu-like symptoms  -pain on swallowing  -redness, blistering, peeling or loosening of the skin, including inside the mouth or nose  -signs and symptoms of bleeding such as bloody or black, tarry stools; red or dark-brown urine; spitting up blood or brown material that looks like coffee grounds; red spots on the skin; unusual bruising or  bleeding from the eye, gums, or nose  -trouble passing urine or change in the amount of urine  -unusually weak or tired  -yellowing of the eyes or skin  Side effects that usually do not require medical attention (report to your doctor or health care professional if they continue or are bothersome):  -diarrhea or constipation  -headache  -nausea, vomiting  -stomach gas, heartburn  This list may not describe all possible side effects. Call your doctor for medical advice about side effects. You may report side effects to FDA at 6-760-CPE-2376.  Where should I keep my medicine?  Keep out of the reach of children.  Store at room temperature between 15 and 30 degrees C (59 and 86 degrees F). Protect from heat and moisture. Do not use this medicine if it has a strong vinegar smell. Throw away any unused medicine after the expiration date.  NOTE: This sheet is a summary. It may not cover all possible information. If you have questions about this medicine, talk to your doctor, pharmacist, or health care provider.  © 2018 [employer]/Gold Standard (2014-08-19 11:30:31)      · Is patient discharged on Warfarin / Coumadin?   No     Depression / Suicide Risk    As you are discharged from this [facility] facility, it is important to learn how to keep safe from harming yourself.    Recognize the warning signs:  · Abrupt changes in personality, positive or negative- including increase in energy   · Giving away possessions  · Change in eating patterns- significant weight changes-  positive or negative  · Change in sleeping patterns- unable to sleep or sleeping all the time   · Unwillingness or inability to communicate  · Depression  · Unusual sadness, discouragement and loneliness  · Talk of wanting to die  · Neglect of personal appearance   · Rebelliousness- reckless behavior  · Withdrawal from people/activities they love  · Confusion- inability to concentrate     If you or a loved one observes any of these behaviors or has  concerns about self-harm, here's what you can do:  · Talk about it- your feelings and reasons for harming yourself  · Remove any means that you might use to hurt yourself (examples: pills, rope, extension cords, firearm)  · Get professional help from the community (Mental Health, Substance Abuse, psychological counseling)  · Do not be alone:Call your Safe Contact- someone whom you trust who will be there for you.  · Call your local CRISIS HOTLINE 752-2285 or 435-036-5616  · Call your local Children's Mobile Crisis Response Team [place] (365) 586-3399 or www.L2C  · Call the toll free National Suicide Prevention Hotlines   · National Suicide Prevention Lifeline 886-562-IFHI (4823)  · National Hope Line Network 800-SUICIDE (704-1435)

## 2020-01-22 NOTE — OR NURSING
1615- Assumed care of patient from DOTTY Starkey. VSS. Patient reports no pain at this time. Right ankle elevated on pillow. Ice pack over c/d/i right knee surgical dressing. Right pedal pulse +2. RLE cap refill <2 seconds. RLE motor function intact.  1620- Reports pain 5/10; see MAR.  1640- Meets criteria for dc to room; report given to DOTTY Wood.

## 2020-01-22 NOTE — PROGRESS NOTES
Report received from A Damian RN. Pt awake, alert, appropriate and pleasant. Reports numbness to anterior R leg from lower thigh to ankle, no tingling. Otherwise, CMS intact. Dressing CDI, no drainage to prevena. Pain controlled. Denies needs at this time. VSS except for tachycardia 110s to, occasionally 120s, present since PACU per report. Call light in reach. Will continue to monitor.

## 2020-01-22 NOTE — PROGRESS NOTES
Pt is alert and oriented x 4, R knee dressing dressing CDI, +CMS, +dorsiflexion, +plantar flexion, cap refill less than 3 seconds, +pulses, denies N/V, unlabored breathing, denies SOB, SCD's and polar ice in use, oriented to use of call light and importance of calling for assistance, bed alarm in use, bed in lowest position, call light within reach, updated on plan of care

## 2020-01-22 NOTE — PROGRESS NOTES
Subjective:      Patient reports doing well. Patient denies chest pain, calf pain, shortness of breath.  Pain is currently under control. Patient is ambulating well with the use of an assistive device.    Objective:    /76   Pulse (!) 113   Temp 37.1 °C (98.8 °F) (Oral)   Resp 14   Ht 1.829 m (6')   Wt 106 kg (233 lb 11 oz)   SpO2 98%     Recent Labs     01/22/20  0453   HEMOGLOBIN 14.0   HEMATOCRIT 39.7*             Intake/Output Summary (Last 24 hours) at 1/22/2020 0542  Last data filed at 1/22/2020 0300  Gross per 24 hour   Intake 2987.82 ml   Output 900 ml   Net 2087.82 ml       Comfortable, no distress  Neurologically and vascularly intact with palpable pedal pulses bilaterally.  Dressing C/D/I      Assessment:    Doing well s/p total knee arthroplasty.    Plan:      Diet as tolerated  Mobilize today - WBAT  OT/PT  DVT ppx - ASA BID + Sequential Compression Devices  Plan to discharge home  Follow-up with Dr. Villavicencio' office approximately 2 weeks post-op.

## 2020-01-22 NOTE — PROGRESS NOTES
0700: Bedside report from Dania LEIVA RN. Pt wakes to voice. No needs at this time. Pending PT/OT and DC.    0830: Pt ambulated in hallway with RN, medicated for pain.     0915: OT at bedside.

## 2020-01-22 NOTE — THERAPY
"Occupational Therapy Evaluation completed.   Functional Status: S/p R TKA. WBAT RLE. A&Ox4. Performs ADL transfers with Sup,crutches. Walks to sink, chair and ~50' in halls with Sup, crutches. Grooming with Sup. FB dressing with Sup. Good activity tolerance. Reviewed home safety during ADL's. UIC, LE's elevated and ice in place.       Plan of Care: Patient with no further skilled OT needs in the acute care setting at this time  Discharge Recommendations:  Equipment: No Equipment Needed. Post-acute therapy Discharge to home with outpatient or home health for additional skilled therapy services. Lives with father.  See \"Rehab Therapy-Acute\" Patient Summary Report for complete documentation.    "

## 2020-01-22 NOTE — ANESTHESIA POSTPROCEDURE EVALUATION
Patient: Iggy Hoyos    Procedure Summary     Date:  01/21/20 Room / Location:   OR 06 / SURGERY [facility]    Anesthesia Start:  1407 Anesthesia Stop:  1557    Procedure:  ARTHROPLASTY, KNEE, TOTAL - CONSTRAINED LEGION (Right Knee) Diagnosis:  (OA RIGHT KNEE WITH PAIN)    Surgeon:  Dominic Villavicencio M.D. Responsible Provider:  Ed Key M.D.    Anesthesia Type:  general, peripheral nerve block ASA Status:  2          Final Anesthesia Type: general, peripheral nerve block  Last vitals  BP   Blood Pressure: 145/91, NIBP: 146/80    Temp   36.3 °C (97.3 °F)    Pulse   Pulse: (!) 115   Resp   14    SpO2   100 %      Anesthesia Post Evaluation    Patient location during evaluation: PACU  Patient participation: complete - patient participated  Level of consciousness: awake and alert  Pain score: 3    Airway patency: patent  Anesthetic complications: no  Cardiovascular status: hemodynamically stable  Respiratory status: acceptable  Hydration status: euvolemic    PONV: none           Nurse Pain Score: 3 (NPRS)

## 2020-01-22 NOTE — PROGRESS NOTES
Pt denies numbness/tingling in right leg. Pain controlled, able to dorsal flex and plantar flex, dressing CDI no drainage. VSS. Pt given discharge instructions, pt verbalizes understanding of discharge instructions, all questions answered. IV DC'd. Pt told to follow up with Dr. Villavicencio . Meets all criteria for DC.

## 2020-01-22 NOTE — OR NURSING
1555 pt to pacu s/p general & nerve block to right knee for R TKA.  VSS. Lungs clear, O2 in place to patent OPA.  Left knee ace cd&i,  Pedal pulse 2+.  Iced,  knee straight.  1610 OPA expelled without problems. HOB 45 degrees.  Pt denies any knee pain. Falls easily back to sleep.  1615 handoff to Cristi STORM.

## 2020-01-22 NOTE — THERAPY
"Physical Therapy Evaluation completed.   Bed Mobility:     Transfers: Sit to Stand: Supervised  Gait: Level Of Assist: Supervised with Crutches       Plan of Care: Patient with no further skilled PT needs in the acute care setting at this time  Discharge Recommendations: Equipment: Crutches. Recommend outpatient physical therapy services to address higher level deficits.    See \"Rehab Therapy-Acute\" Patient Summary Report for complete documentation.     Pt was seen s/p R TKA with WBAT orders for RLE. Pt was able to demosntrate SPV level of functional mobility with crutches. Pt was able to go up/down 2 steps with cruthces and able to demo safe gait mechanics. Pt educated on supine ther-ex and able to demo correct mechanics. Pt edcuated on icing, elevation, and positioning. Pt is in no acute skilled PT needs at this time, anticipate pt to d/c home with father support and recs for OP therapy services.   "

## 2020-01-22 NOTE — DISCHARGE SUMMARY
Patient was admitted for a total knee arthroplasty.  There were no complications during the surgery. Did well post-operatively.         Recent Labs     01/22/20  0453   HEMOGLOBIN 14.0   HEMATOCRIT 39.7*       There are no active hospital problems to display for this patient.      Uneventful hospital course.     Medication List      START taking these medications      Instructions   meloxicam 7.5 MG Tabs  Commonly known as:  MOBIC   Take 2 Tabs by mouth every day for 30 days.  Dose:  15 mg     oxyCODONE immediate-release 5 MG Tabs  Commonly known as:  ROXICODONE   Take 1-2 Tabs by mouth every four hours as needed for up to 7 days.  Dose:  5-10 mg     tramadol 50 MG Tabs  Commonly known as:  ULTRAM   Take 1-2 Tabs by mouth every 6 hours as needed for up to 10 days.  Dose:   mg        CONTINUE taking these medications      Instructions   ACETAMINOPHEN 8 HOUR 650 MG CR tablet  Generic drug:  acetaminophen   Take 650 mg by mouth every 6 hours as needed.  Dose:  650 mg     albuterol 108 (90 Base) MCG/ACT Aers inhalation aerosol   Inhale 2 Puffs by mouth every 6 hours as needed.  Dose:  2 Puff              Patient will be discharged home and follow up with Dr. Villavicencio in 2 weeks, for which the patient already has scheduled. [facility] office phone number is [phone].  Patient to begin Physical Therapy as currently scheduled.  Encourage ROM of the knee.     Instructions:  -Leave the bandage in place until your followup appointment in 2 weeks.  -Call if there is drainage beneath the bandage  -Use ice and elevation frequently to help with pain and swelling control  -Put as much weight as comfortable on the operative leg.  Use a walker to assist with balance.  -Take your blood clot prevention medication for 4 weeks after surgery.

## 2020-01-22 NOTE — PROGRESS NOTES
Pt Iggy Hoyos admitted to room 222-1 via transport in bed from PACU at 1700.  Pt A&Ox4 .vs pain reported at 3 on a scale of 0-10. Oriented to room call light and smoking policy.  Reviewed plan of care (equipment, incentive spirometer, sequential compression devices, medications, activity, diet, fall precautions, skin care, and pain) with patient and family. Welcome packet given and reviewed with patient, all questions answered. Education provided on oral hygiene program.

## 2022-08-16 PROBLEM — Z00.00 ENCOUNTER FOR PREVENTIVE HEALTH EXAMINATION: Status: ACTIVE | Noted: 2022-08-16

## 2022-08-16 PROBLEM — R03.0 BORDERLINE HIGH BLOOD PRESSURE: Status: ACTIVE | Noted: 2022-08-16

## 2022-08-16 PROBLEM — F31.9 BIPOLAR 1 DISORDER (HCC): Status: ACTIVE | Noted: 2022-08-16

## 2022-08-16 PROBLEM — Z00.00 ENCOUNTER FOR PREVENTIVE HEALTH EXAMINATION: Status: RESOLVED | Noted: 2022-08-16 | Resolved: 2022-08-16

## 2022-08-16 PROBLEM — F84.0 AUTISM SPECTRUM DISORDER: Status: ACTIVE | Noted: 2022-08-16

## 2022-08-16 PROBLEM — Z96.651 HISTORY OF TOTAL RIGHT KNEE REPLACEMENT: Status: ACTIVE | Noted: 2022-08-16

## 2022-08-16 PROBLEM — Z00.00 HEALTHCARE MAINTENANCE: Status: ACTIVE | Noted: 2022-08-16

## 2022-09-21 PROBLEM — R73.03 PREDIABETES: Status: ACTIVE | Noted: 2022-09-21

## 2022-09-21 PROBLEM — M25.561 CHRONIC PAIN OF RIGHT KNEE: Status: ACTIVE | Noted: 2022-09-21

## 2022-09-21 PROBLEM — G89.29 CHRONIC PAIN OF RIGHT KNEE: Status: ACTIVE | Noted: 2022-09-21

## 2022-09-21 PROBLEM — E78.1 HYPERTRIGLYCERIDEMIA: Status: ACTIVE | Noted: 2022-09-21

## 2023-09-06 PROBLEM — J06.9 URI WITH COUGH AND CONGESTION: Status: ACTIVE | Noted: 2023-09-06

## (undated) DEVICE — ARTHROWAND TURBOVAC 3.5/90 SCT

## (undated) DEVICE — GLOVE BIOGEL PI ORTHO SZ 7.5 PF LF (40PR/BX)

## (undated) DEVICE — CANISTER SUCTION 3000ML MECHANICAL FILTER AUTO SHUTOFF MEDI-VAC NONSTERILE LF DISP  (40EA/CA)

## (undated) DEVICE — BLADE SAGITTAL SAW DUAL CUT 25.0 X 90.0 X 1.27MM (1/EA)

## (undated) DEVICE — PAD PREP 24 X 48 CUFFED - (100/CA)

## (undated) DEVICE — BLADE 90X18X1.27MM SAW SAGITTAL

## (undated) DEVICE — PACK MINOR BASIN - (2EA/CA)

## (undated) DEVICE — HANDPIECE 10FT INTPLS SCT PLS IRRIGATION HAND CONTROL SET (6/PK)

## (undated) DEVICE — TIP INTPLS HFLO ML ORFC BTRY - (12/CS)  FOR SURGILAV

## (undated) DEVICE — SYSTEM PREVENA INCISION MNGM - (1/EA)

## (undated) DEVICE — TUBE CONNECTING SUCTION - CLEAR PLASTIC STERILE 72 IN (50EA/CA)

## (undated) DEVICE — SODIUM CHL IRRIGATION 0.9% 1000ML (12EA/CA)

## (undated) DEVICE — GLOVE, LITE (PAIR)

## (undated) DEVICE — BLADE SURGICAL CLIPPER - (50EA/CA)

## (undated) DEVICE — SUTURE 2-0 VICRYL PLUS CT-1 - 8 X 18 INCH(12/BX)

## (undated) DEVICE — GLOVE BIOGEL INDICATOR SZ 6.5 SURGICAL PF LTX - (50PR/BX 4BX/CA)

## (undated) DEVICE — TUBING CLEARLINK DUO-VENT - C-FLO (48EA/CA)

## (undated) DEVICE — SHAVER 5.5 RESECTOR FORMULA (5EA/BX )

## (undated) DEVICE — SENSOR SPO2 NEO LNCS ADHESIVE (20/BX) SEE USER NOTES

## (undated) DEVICE — STAPLER SKIN DISP - (6/BX 10BX/CA) VISISTAT

## (undated) DEVICE — GLOVE BIOGEL INDICATOR SZ 7.5 SURGICAL PF LTX - (50PR/BX 4BX/CA)

## (undated) DEVICE — CHLORAPREP 26 ML APPLICATOR - ORANGE TINT(25/CA)

## (undated) DEVICE — SUCTION INSTRUMENT YANKAUER BULBOUS TIP W/O VENT (50EA/CA)

## (undated) DEVICE — LACTATED RINGERS INJ 1000 ML - (14EA/CA 60CA/PF)

## (undated) DEVICE — BANDAGE ELASTIC 6 IN X 5 YDS - LATEX FREE (10/BX)

## (undated) DEVICE — PAD UNIVERSAL MULTI USE (1/EA)

## (undated) DEVICE — DRL BIT4.5 STR ENDOSCPC CANN

## (undated) DEVICE — PROTECTOR ULNA NERVE - (36PR/CA)

## (undated) DEVICE — Device

## (undated) DEVICE — GLOVE BIOGEL SZ 7.5 SURGICAL PF LTX - (50PR/BX 4BX/CA)

## (undated) DEVICE — VESSELOOP MAXI BLUE STERILE- SURG-I-LOOP (10EA/BX)

## (undated) DEVICE — WATER IRRIGATION STERILE 1000ML (12EA/CA)

## (undated) DEVICE — GLOVE BIOGEL INDICATOR SZ 8.5 SURGICAL PF LTX - (50/BX 4BX/CA)

## (undated) DEVICE — BLADE SURGICAL #15 - (50/BX 3BX/CA)

## (undated) DEVICE — GOWN SURGEONS X-LARGE - DISP. (30/CA)

## (undated) DEVICE — GOWN WARMING STANDARD FLEX - (30/CA)

## (undated) DEVICE — BLOCK

## (undated) DEVICE — KIT ANESTHESIA W/CIRCUIT & 3/LT BAG W/FILTER (20EA/CA)

## (undated) DEVICE — CLOSURE SKIN STRIP 1/2 X 4 IN - (STERI STRIP) (50/BX 4BX/CA)

## (undated) DEVICE — SUTURE 2-0 MONOCRYL SH&UR-6 27 - (12/BX)

## (undated) DEVICE — GLOVE BIOGEL SZ 8 SURGICAL PF LTX - (50PR/BX 4BX/CA)

## (undated) DEVICE — PADDING CAST 6 IN STERILE - 6 X 4 YDS (24/CA)

## (undated) DEVICE — PACK LOWER EXTREMITY - (2/CA)

## (undated) DEVICE — KIT ROOM DECONTAMINATION

## (undated) DEVICE — PACK KNEE ARTHROSCOPY SM OR - (2EA/CA)

## (undated) DEVICE — DRAPE C-ARM LARGE 41IN X 74 IN - (10/BX 2BX/CA)

## (undated) DEVICE — GLOVE BIOGEL SZ 6.5 SURGICAL PF LTX (50PR/BX 4BX/CA)

## (undated) DEVICE — TUBING CASSETTE CROSSFLOW INTEGRATED (10EA/CA)

## (undated) DEVICE — SODIUM CHL. IRRIGATION 0.9% 3000ML (4EA/CA 65CA/PF)

## (undated) DEVICE — GUARD SPLASH FOR PULSEVAC (12EA/PK)

## (undated) DEVICE — GVL 4 STAT DISPOSABLE - (10/BX)

## (undated) DEVICE — DRESSING XEROFORM 1X8 - (50/BX 4BX/CA)

## (undated) DEVICE — TOURNIQUET CUFF 34 X 4 ONE PORT DISP - STERILE (10/BX)

## (undated) DEVICE — PIN TROCAR GEN 1/8X3 (4EA/BX)

## (undated) DEVICE — GLOVE BIOGEL INDICATOR SZ 8 SURGICAL PF LTX - (50/BX 4BX/CA)

## (undated) DEVICE — DRAPE SURGICAL U 77X120 - (10/CA)

## (undated) DEVICE — MIXER BONE CEMENT REVOLUTION - W/FEMORAL PRESSURIZER (6/CA)

## (undated) DEVICE — NEPTUNE 4 PORT MANIFOLD - (20/PK)

## (undated) DEVICE — BLADE SHAVER AGGRESSIVE PLUS 4.0MM ANGLED (5EA/BX)

## (undated) DEVICE — GLOVE BIOGEL SZ 7 SURGICAL PF LTX - (50PR/BX 4BX/CA)

## (undated) DEVICE — ELECTRODE 850 FOAM ADHESIVE - HYDROGEL RADIOTRNSPRNT (50/PK)

## (undated) DEVICE — HEAD HOLDER JUNIOR/ADULT

## (undated) DEVICE — SET EXTENSION WITH 2 PORTS (48EA/CA) ***PART #2C8610 IS A SUBSTITUTE*****

## (undated) DEVICE — SUTURE 3-0 MONOCRYL PLUS PS-1 - 27 INCH (36/BX)

## (undated) DEVICE — SUTURE PRELOADED #2 ULTRABRAID COBRAID (10EA/BX)

## (undated) DEVICE — BLADE SAGITTAL 34MM

## (undated) DEVICE — TUBE E-T HI-LO CUFF 7.0MM (10EA/PK)

## (undated) DEVICE — MASK ANESTHESIA ADULT  - (100/CA)

## (undated) DEVICE — TRAY SRGPRP PVP IOD WT PRP - (20/CA)

## (undated) DEVICE — SUTURE 2-0 VICRYL PLUS CT-1 36 (36PK/BX)"

## (undated) DEVICE — CANISTER SUCTION RIGID RED 1500CC (40EA/CA)

## (undated) DEVICE — DRAPE LARGE 3 QUARTER - (20/CA)

## (undated) DEVICE — ELECTRODE DUAL RETURN W/ CORD - (50/PK)

## (undated) DEVICE — GOWN SURGICAL XX-LARGE - (28EA/CA) SIRUS NON REINFORCED

## (undated) DEVICE — LENS/HOOD FOR SPACESUIT - (32/PK) PEEL AWAY FACE

## (undated) DEVICE — MASK, LARYNGEAL AIRWAY #4

## (undated) DEVICE — NEEDLE W/FACET TIP DULL VERSION W/STIMULATION CABLE SONOPLEX 21G X 4 (10/EA)"

## (undated) DEVICE — DRAPE LOWER EXTREMETY - (6/CA)

## (undated) DEVICE — SUTURE GENERAL

## (undated) DEVICE — PACK TOTAL KNEE  (1/CA)

## (undated) DEVICE — STOCKINETTE IMPERVIOUS 12X48 - STERILELF (10/CA)"

## (undated) DEVICE — SUTURE 3-0 PROLENE PS-1 (12PK/BX)

## (undated) DEVICE — DERMABOND ADVANCED - (12EA/BX)

## (undated) DEVICE — STYLETTE 6FR INFANT STERILE SINGEL ALUMINUM SUNSLIP SEALED IN PVC SHEATH (20EA/BX)

## (undated) DEVICE — DRESSING AQUACEL AG ADVANTAGE 3.5 X 10" (10EA/BX)"

## (undated) DEVICE — GLOVE BIOGEL M SZ 8 SURGICAL PF LTX - (50/BX 4BX/CA)